# Patient Record
Sex: MALE | Race: WHITE | NOT HISPANIC OR LATINO | Employment: OTHER | URBAN - METROPOLITAN AREA
[De-identification: names, ages, dates, MRNs, and addresses within clinical notes are randomized per-mention and may not be internally consistent; named-entity substitution may affect disease eponyms.]

---

## 2018-02-06 ENCOUNTER — CLINICAL SUPPORT (OUTPATIENT)
Dept: OCCUPATIONAL MEDICINE | Facility: CLINIC | Age: 48
End: 2018-02-06

## 2018-02-06 PROCEDURE — 99499 UNLISTED E&M SERVICE: CPT | Mod: S$GLB,,, | Performed by: PREVENTIVE MEDICINE

## 2018-10-03 DIAGNOSIS — R07.89 OTHER CHEST PAIN: Primary | ICD-10-CM

## 2019-01-16 ENCOUNTER — OFFICE VISIT (OUTPATIENT)
Dept: UROLOGY | Facility: CLINIC | Age: 49
End: 2019-01-16
Payer: COMMERCIAL

## 2019-01-16 VITALS
SYSTOLIC BLOOD PRESSURE: 120 MMHG | DIASTOLIC BLOOD PRESSURE: 86 MMHG | HEART RATE: 72 BPM | BODY MASS INDEX: 36.7 KG/M2 | HEIGHT: 66 IN | WEIGHT: 228.38 LBS

## 2019-01-16 DIAGNOSIS — R10.2 ABDOMINAL PAIN, SUPRAPUBIC: ICD-10-CM

## 2019-01-16 DIAGNOSIS — Z78.9 DOUBLE VOIDING: Primary | ICD-10-CM

## 2019-01-16 DIAGNOSIS — N43.3 HYDROCELE, UNSPECIFIED HYDROCELE TYPE: ICD-10-CM

## 2019-01-16 LAB
BILIRUB SERPL-MCNC: NORMAL MG/DL
BLOOD URINE, POC: NORMAL
COLOR, POC UA: YELLOW
GLUCOSE UR QL STRIP: NORMAL
KETONES UR QL STRIP: NORMAL
LEUKOCYTE ESTERASE URINE, POC: NORMAL
NITRITE, POC UA: NORMAL
PH, POC UA: 5.5
PROTEIN, POC: NORMAL
SPECIFIC GRAVITY, POC UA: 1.02
UROBILINOGEN, POC UA: 0.2

## 2019-01-16 PROCEDURE — 3079F DIAST BP 80-89 MM HG: CPT | Mod: CPTII,S$GLB,, | Performed by: UROLOGY

## 2019-01-16 PROCEDURE — 3074F SYST BP LT 130 MM HG: CPT | Mod: CPTII,S$GLB,, | Performed by: UROLOGY

## 2019-01-16 PROCEDURE — 99204 PR OFFICE/OUTPT VISIT, NEW, LEVL IV, 45-59 MIN: ICD-10-PCS | Mod: 25,S$GLB,, | Performed by: UROLOGY

## 2019-01-16 PROCEDURE — 81002 URINALYSIS NONAUTO W/O SCOPE: CPT | Mod: S$GLB,,, | Performed by: UROLOGY

## 2019-01-16 PROCEDURE — 3008F BODY MASS INDEX DOCD: CPT | Mod: CPTII,S$GLB,, | Performed by: UROLOGY

## 2019-01-16 PROCEDURE — 81002 POCT URINE DIPSTICK WITHOUT MICROSCOPE: ICD-10-PCS | Mod: S$GLB,,, | Performed by: UROLOGY

## 2019-01-16 PROCEDURE — 87086 URINE CULTURE/COLONY COUNT: CPT

## 2019-01-16 PROCEDURE — 99204 OFFICE O/P NEW MOD 45 MIN: CPT | Mod: 25,S$GLB,, | Performed by: UROLOGY

## 2019-01-16 PROCEDURE — 3074F PR MOST RECENT SYSTOLIC BLOOD PRESSURE < 130 MM HG: ICD-10-PCS | Mod: CPTII,S$GLB,, | Performed by: UROLOGY

## 2019-01-16 PROCEDURE — 99999 PR PBB SHADOW E&M-EST. PATIENT-LVL III: ICD-10-PCS | Mod: PBBFAC,,, | Performed by: UROLOGY

## 2019-01-16 PROCEDURE — 3008F PR BODY MASS INDEX (BMI) DOCUMENTED: ICD-10-PCS | Mod: CPTII,S$GLB,, | Performed by: UROLOGY

## 2019-01-16 PROCEDURE — 3079F PR MOST RECENT DIASTOLIC BLOOD PRESSURE 80-89 MM HG: ICD-10-PCS | Mod: CPTII,S$GLB,, | Performed by: UROLOGY

## 2019-01-16 PROCEDURE — 99999 PR PBB SHADOW E&M-EST. PATIENT-LVL III: CPT | Mod: PBBFAC,,, | Performed by: UROLOGY

## 2019-01-16 RX ORDER — NAPROXEN SODIUM 220 MG
220 TABLET ORAL
COMMUNITY

## 2019-01-16 RX ORDER — MELOXICAM 7.5 MG/1
1 TABLET ORAL DAILY
Refills: 0 | COMMUNITY
Start: 2019-01-10

## 2019-01-16 RX ORDER — ALLOPURINOL 100 MG/1
100 TABLET ORAL DAILY
COMMUNITY

## 2019-01-16 RX ORDER — ESOMEPRAZOLE MAGNESIUM 40 MG/1
40 CAPSULE, DELAYED RELEASE ORAL
COMMUNITY

## 2019-01-16 RX ORDER — COLCHICINE 0.6 MG/1
TABLET ORAL
COMMUNITY

## 2019-01-16 RX ORDER — LOSARTAN POTASSIUM 50 MG/1
50 TABLET ORAL DAILY
Refills: 0 | COMMUNITY
Start: 2019-01-10

## 2019-01-16 RX ORDER — MULTIVITAMIN
1 TABLET ORAL DAILY
COMMUNITY

## 2019-01-16 NOTE — PROGRESS NOTES
HPI:  Gomez Díaz is a 48 y.o. year old male that  presents with   Chief Complaint   Patient presents with    Abdominal Pain    Urinary Frequency    Dysuria   .  This patient referred himself to the office with suprapubic pain and occasional double voiding.    He denies dysuria fevers or chills.  He has nocturia x1 and has a good strength stream with no straining to void    Patient is deaf but is able to read lips.  His wife also helps with the history    There is no family history of kidney bladder prostate cancer the patient has never had urologic surgery.    Patient denies scrotal pain    Chart review shows progress no from ENT showing hearing loss.  April 2018 PSA 0.39 and GFR greater than 60.  June 2015 pelvic AP pelvis film shows no stones.      Past Medical History:   Diagnosis Date    Hypertension      Social History     Socioeconomic History    Marital status:      Spouse name: Not on file    Number of children: Not on file    Years of education: Not on file    Highest education level: Not on file   Social Needs    Financial resource strain: Not on file    Food insecurity - worry: Not on file    Food insecurity - inability: Not on file    Transportation needs - medical: Not on file    Transportation needs - non-medical: Not on file   Occupational History    Not on file   Tobacco Use    Smoking status: Never Smoker   Substance and Sexual Activity    Alcohol use: Not on file    Drug use: Not on file    Sexual activity: Not on file   Other Topics Concern    Not on file   Social History Narrative    Not on file     Past Surgical History:   Procedure Laterality Date    CARPAL TUNNEL RELEASE Left      History reviewed. No pertinent family history.        Review of Systems  The patient has no chest pains.  The patient has no shortness of breath  Patient wears        glasses.  All other review of systems are negative.      Physical Exam:  /86 (BP Location: Right arm, Patient  "Position: Sitting, BP Method: Large (Automatic))   Pulse 72   Ht 5' 6" (1.676 m)   Wt 103.6 kg (228 lb 6.3 oz)   BMI 36.86 kg/m²   General appearance: alert, cooperative, no distress  Constitutional:Oriented to person, place, and time.appears well-developed and well-nourished.   HEENT: Normocephalic, atraumatic, neck symmetrical, no nasal discharge   Eyes: conjunctivae/corneas clear, PERRL, EOM's intact  Lungs: clear to auscultation bilaterally, no dullness to percussion bilaterally  Heart: regular rate and rhythm without rub; no displacement of the PMI   Abdomen: soft, non-tender; bowel sounds normoactive; no organomegaly  :  Penis/perineum without lesions, scrotum without rash/cysts, epididymis nontender bilaterally, urethral meatus in normal location normal size, no penile plaques palpated, prostate:    Smooth minimally enlarged and benign-feeling                   seminal vesicles not palpated.  No rectal masses, sphincter tone normal.  Normal right testicle.  Unable to palpate left testicle due to moderate size hydrocele  Extremities: extremities symmetric; no clubbing, cyanosis, or edema  Integument: Skin color, texture, turgor normal; no rashes; hair distrubution normal  Neurologic: Alert and oriented X 3, normal strength, normal coordination and gait  Psychiatric: no pressured speech; normal affect; no evidence of impaired cognition     LABS:    Complete Blood Count  Lab Results   Component Value Date    RBC 4.49 (L) 04/25/2018    HGB 13.6 (L) 04/25/2018    HCT 40.5 04/25/2018    MCV 90 04/25/2018    MCH 30.3 04/25/2018    MCHC 33.6 04/25/2018    RDW 12.9 04/25/2018     04/25/2018    MPV 10.3 04/25/2018    GRAN 2.7 04/25/2018    GRAN 51.0 04/25/2018    LYMPH 1.9 04/25/2018    LYMPH 36.3 04/25/2018    MONO 0.5 04/25/2018    MONO 10.2 04/25/2018    EOS 0.1 04/25/2018    BASO 0.03 04/25/2018    EOSINOPHIL 1.9 04/25/2018    BASOPHIL 0.6 04/25/2018    DIFFMETHOD Automated 04/25/2018 "       Comprehensive Metabolic Panel  Lab Results   Component Value Date     (H) 04/25/2018    BUN 15 04/25/2018    CREATININE 0.80 04/25/2018     04/25/2018    K 4.3 04/25/2018     04/25/2018    PROT 7.4 04/25/2018    ALBUMIN 4.5 04/25/2018    BILITOT 0.5 04/25/2018    AST 35 04/25/2018    ALKPHOS 50 04/25/2018    CO2 26 04/25/2018    ALT 65 (H) 04/25/2018    ANIONGAP 13 04/25/2018    EGFRNONAA >60.0 04/25/2018    ESTGFRAFRICA >60.0 04/25/2018       PSA  Lab Results   Component Value Date    PSA 0.39 04/25/2018         Assessment:    ICD-10-CM ICD-9-CM    1. Double voiding R39.89 788.99 POCT URINE DIPSTICK WITHOUT MICROSCOPE   2. Abdominal pain, suprapubic R10.2 789.09 Urine culture   3. Hydrocele, unspecified hydrocele type N43.3 603.9 US Scrotum And Testicles     The primary encounter diagnosis was Double voiding. Diagnoses of Abdominal pain, suprapubic and Hydrocele, unspecified hydrocele type were also pertinent to this visit.      Plan:  1 and 2.  Double voiding and abdominal pain.  I discussed with the patient I cannot explain symptoms.  Patient's urine will be cultured and once results are available ,we will contact the patient if antibiotics are indicated.  Follow-up 1 week in [place] for flow rate and postvoid residual.  Depending on results, may consider trial of alpha blockers.    3.  Hydrocele.  Not bothering patient. Will check scrotal ultrasound and discuss at next office visit  Orders Placed This Encounter   Procedures    Urine culture    US Scrotum And Testicles    POCT URINE DIPSTICK WITHOUT MICROSCOPE           Phan Wilcox MD    PLEASE NOTE:  Please be advised that portions of this note were dictated using voice recognition software and may contain dictation related errors in spelling/grammar/appropriate pronouns/syntax or other errors that might have not been found and or corrected on text review.

## 2019-01-17 LAB — BACTERIA UR CULT: NO GROWTH

## 2021-04-13 ENCOUNTER — CLINICAL SUPPORT (OUTPATIENT)
Dept: URGENT CARE | Facility: CLINIC | Age: 51
End: 2021-04-13

## 2021-04-13 DIAGNOSIS — Z00.00 PHYSICAL EXAM: Primary | ICD-10-CM

## 2021-04-13 PROCEDURE — 99499 PHYSICAL - BASIC COMPLEXITY: ICD-10-PCS | Mod: S$GLB,,, | Performed by: PREVENTIVE MEDICINE

## 2021-04-13 PROCEDURE — 99499 UNLISTED E&M SERVICE: CPT | Mod: S$GLB,,, | Performed by: PREVENTIVE MEDICINE

## 2021-04-13 RX ORDER — GEMFIBROZIL 600 MG/1
TABLET, FILM COATED ORAL
COMMUNITY

## 2021-04-13 RX ORDER — HYDROCODONE BITARTRATE AND IBUPROFEN 7.5; 2 MG/1; MG/1
TABLET, FILM COATED ORAL
COMMUNITY

## 2021-04-13 RX ORDER — PANTOPRAZOLE SODIUM 40 MG/1
TABLET, DELAYED RELEASE ORAL
COMMUNITY

## 2021-04-13 RX ORDER — MAGNESIUM 200 MG
TABLET ORAL
COMMUNITY

## 2021-04-13 RX ORDER — AMLODIPINE BESYLATE 10 MG/1
TABLET ORAL
COMMUNITY

## 2021-04-13 RX ORDER — LORAZEPAM 0.5 MG/1
TABLET ORAL
COMMUNITY

## 2021-04-13 RX ORDER — FUROSEMIDE 40 MG/1
TABLET ORAL
COMMUNITY

## 2021-04-13 RX ORDER — ESOMEPRAZOLE MAGNESIUM 40 MG/1
CAPSULE, DELAYED RELEASE ORAL
COMMUNITY

## 2021-04-13 RX ORDER — GUAIFENESIN 100 MG/5ML
10 SOLUTION ORAL
COMMUNITY

## 2021-04-13 RX ORDER — COLCHICINE 0.6 MG/1
TABLET ORAL
COMMUNITY

## 2021-04-13 RX ORDER — MELOXICAM 7.5 MG/1
TABLET ORAL
COMMUNITY

## 2021-04-13 RX ORDER — LISINOPRIL 5 MG/1
TABLET ORAL
COMMUNITY

## 2021-04-13 RX ORDER — LANOLIN ALCOHOL/MO/W.PET/CERES
CREAM (GRAM) TOPICAL
COMMUNITY

## 2021-04-13 RX ORDER — HYDROCODONE BITARTRATE AND ACETAMINOPHEN 10; 325 MG/1; MG/1
TABLET ORAL
COMMUNITY

## 2021-04-13 RX ORDER — LOSARTAN POTASSIUM 100 MG/1
TABLET ORAL
COMMUNITY

## 2021-04-13 RX ORDER — VALSARTAN 160 MG/1
TABLET ORAL
COMMUNITY

## 2024-04-30 ENCOUNTER — OFFICE VISIT (OUTPATIENT)
Dept: UROLOGY | Facility: CLINIC | Age: 54
End: 2024-04-30
Payer: COMMERCIAL

## 2024-04-30 VITALS
HEIGHT: 66 IN | HEART RATE: 92 BPM | BODY MASS INDEX: 38.25 KG/M2 | DIASTOLIC BLOOD PRESSURE: 76 MMHG | SYSTOLIC BLOOD PRESSURE: 120 MMHG | WEIGHT: 238 LBS

## 2024-04-30 DIAGNOSIS — N45.2 ORCHITIS: ICD-10-CM

## 2024-04-30 DIAGNOSIS — N40.1 BPH WITH OBSTRUCTION/LOWER URINARY TRACT SYMPTOMS: ICD-10-CM

## 2024-04-30 DIAGNOSIS — N50.819 PAIN IN TESTICLE, UNSPECIFIED LATERALITY: Primary | ICD-10-CM

## 2024-04-30 DIAGNOSIS — N13.8 BPH WITH OBSTRUCTION/LOWER URINARY TRACT SYMPTOMS: ICD-10-CM

## 2024-04-30 PROCEDURE — 4010F ACE/ARB THERAPY RXD/TAKEN: CPT | Mod: CPTII,S$GLB,,

## 2024-04-30 PROCEDURE — 3008F BODY MASS INDEX DOCD: CPT | Mod: CPTII,S$GLB,,

## 2024-04-30 PROCEDURE — 99999 PR PBB SHADOW E&M-NEW PATIENT-LVL V: CPT | Mod: PBBFAC,,,

## 2024-04-30 PROCEDURE — 99204 OFFICE O/P NEW MOD 45 MIN: CPT | Mod: S$GLB,,,

## 2024-04-30 PROCEDURE — 3074F SYST BP LT 130 MM HG: CPT | Mod: CPTII,S$GLB,,

## 2024-04-30 PROCEDURE — 1160F RVW MEDS BY RX/DR IN RCRD: CPT | Mod: CPTII,S$GLB,,

## 2024-04-30 PROCEDURE — 3078F DIAST BP <80 MM HG: CPT | Mod: CPTII,S$GLB,,

## 2024-04-30 PROCEDURE — 1159F MED LIST DOCD IN RCRD: CPT | Mod: CPTII,S$GLB,,

## 2024-04-30 RX ORDER — TAMSULOSIN HYDROCHLORIDE 0.4 MG/1
0.4 CAPSULE ORAL NIGHTLY
Qty: 90 CAPSULE | Refills: 3 | Status: SHIPPED | OUTPATIENT
Start: 2024-04-30 | End: 2025-04-30

## 2024-04-30 RX ORDER — DOXYCYCLINE 100 MG/1
100 CAPSULE ORAL EVERY 12 HOURS
Qty: 14 CAPSULE | Refills: 0 | Status: SHIPPED | OUTPATIENT
Start: 2024-04-30 | End: 2024-04-30

## 2024-04-30 RX ORDER — DOXYCYCLINE 100 MG/1
100 CAPSULE ORAL EVERY 12 HOURS
Qty: 14 CAPSULE | Refills: 0 | Status: SHIPPED | OUTPATIENT
Start: 2024-04-30 | End: 2024-05-07

## 2024-04-30 NOTE — PATIENT INSTRUCTIONS
Trial of Flomax.  Instructed patient to take 1st dose at night, 30 min after dinner, due to potential 1st dose syncope episode. Patient was also informed and educated on side effects including retrograde ejaculation.

## 2024-04-30 NOTE — PROGRESS NOTES
CHIEF COMPLAINT:  Left testicle pain and swelling       HISTORY OF PRESENTING ILLINESS:  Gomez Díaz is a 54 y.o. male new to [facility]. Presents today due to left testicle discomfort and swelling x 3-4 weeks. He is a referral from Dr. Osborn. Here today with his wife, Kath. Patient is deaf but he is able to read lips. He also reports frequent urination with urgency, present for at least 5 years. He has never tried an alpha blocker for this problem. PMHx listed below.             REVIEW OF SYSTEMS:  Review of Systems   Constitutional:  Negative for chills and fever.   HENT:  Positive for hearing loss. Negative for congestion and sore throat.    Respiratory:  Negative for cough and shortness of breath.    Cardiovascular:  Negative for chest pain and palpitations.   Gastrointestinal:  Negative for nausea and vomiting.   Genitourinary:  Positive for frequency and urgency. Negative for dysuria, flank pain and hematuria.   Neurological:  Negative for dizziness and headaches.         PATIENT HISTORY:    Past Medical History:   Diagnosis Date    Hypertension        Past Surgical History:   Procedure Laterality Date    CARPAL TUNNEL RELEASE Left        No family history on file.    Social History     Socioeconomic History    Marital status:    Tobacco Use    Smoking status: Never     Social Determinants of Health     Financial Resource Strain: Low Risk  (4/29/2024)    Overall Financial Resource Strain (CARDIA)     Difficulty of Paying Living Expenses: Not very hard   Food Insecurity: No Food Insecurity (4/29/2024)    Hunger Vital Sign     Worried About Running Out of Food in the Last Year: Never true     Ran Out of Food in the Last Year: Never true   Transportation Needs: No Transportation Needs (4/29/2024)    PRAPARE - Transportation     Lack of Transportation (Medical): No     Lack of Transportation (Non-Medical): No   Physical Activity: Unknown (4/29/2024)    Exercise Vital Sign     Days of Exercise  per Week: 3 days   Stress: No Stress Concern Present (4/29/2024)    [place] [employer] - Occupational Stress Questionnaire     Feeling of Stress : Not at all   Housing Stability: Unknown (4/29/2024)    Housing Stability Vital Sign     Unable to Pay for Housing in the Last Year: No       Allergies:  Lisinopril    Medications:    Current Outpatient Medications:     allopurinol (ZYLOPRIM) 100 MG tablet, Take 100 mg by mouth once daily., Disp: , Rfl:     amLODIPine (NORVASC) 10 MG tablet, amlodipine 10 mg tablet, Disp: , Rfl:     colchicine (COLCRYS) 0.6 mg tablet, colchicine 0.6 mg tablet, Disp: , Rfl:     colchicine (COLCRYS) 0.6 mg tablet, colchicine 0.6 mg tablet, Disp: , Rfl:     cyanocobalamin (VITAMIN B-12) 1000 MCG tablet, Vitamin B-12  1,000 mcg tablet  TK 1 T PO D, Disp: , Rfl:     cyanocobalamin, vitamin B-12, 1,000 mcg Subl, cyanocobalamin (vit B-12) 1,000 mcg sublingual tablet  Place 1 tablet(s) every day by sublingual route., Disp: , Rfl:     esomeprazole (NEXIUM) 40 MG capsule, Take 40 mg by mouth before breakfast., Disp: , Rfl:     esomeprazole (NEXIUM) 40 MG capsule, esomeprazole magnesium 40 mg capsule,delayed release  Take 1 capsule(s) every day by oral route., Disp: , Rfl:     furosemide (LASIX) 40 MG tablet, furosemide 40 mg tablet, Disp: , Rfl:     gemfibroziL (LOPID) 600 MG tablet, gemfibrozil 600 mg tablet, Disp: , Rfl:     guaiFENesin 100 mg/5 ml (ROBITUSSIN) 100 mg/5 mL syrup, 10 mLs., Disp: , Rfl:     HYDROcodone-acetaminophen (NORCO)  mg per tablet, hydrocodone 10 mg-acetaminophen 325 mg tablet, Disp: , Rfl:     hydrocodone-ibuprofen 7.5-200 mg (VICOPROFEN) 7.5-200 mg per tablet, hydrocodone 7.5 mg-ibuprofen 200 mg tablet, Disp: , Rfl:     lisinopriL (PRINIVIL,ZESTRIL) 5 MG tablet, lisinopril 5 mg tablet, Disp: , Rfl:     LORazepam (ATIVAN) 0.5 MG tablet, lorazepam 0.5 mg tablet, Disp: , Rfl:     losartan (COZAAR) 100 MG tablet, losartan 100 mg tablet  TAKE 1  TABLET BY MOUTH EVERY DAY, Disp: , Rfl:     losartan (COZAAR) 50 MG tablet, Take 50 mg by mouth once daily., Disp: , Rfl: 0    meloxicam (MOBIC) 7.5 MG tablet, Take 1 tablet by mouth once daily., Disp: , Rfl: 0    meloxicam (MOBIC) 7.5 MG tablet, meloxicam 7.5 mg tablet  TAKE 1 TABLET BY MOUTH EVERY DAY, Disp: , Rfl:     multivitamin (MEN'S MULTI-VITAMIN) per tablet, Take 1 tablet by mouth once daily., Disp: , Rfl:     naproxen sodium (ALEVE) 220 MG tablet, Take 220 mg by mouth as needed., Disp: , Rfl:     olmesartan (BENICAR) 40 MG tablet, Take 40 mg by mouth once daily., Disp: , Rfl:     pantoprazole (PROTONIX) 40 MG tablet, pantoprazole 40 mg tablet,delayed release  TAKE 1 TABLET BY MOUTH EVERY DAY, Disp: , Rfl:     valsartan (DIOVAN) 160 MG tablet, Diovan 160 mg tablet  TAKE 1 TABLET BY MOUTH EVERY DAY, Disp: , Rfl:     doxycycline (VIBRAMYCIN) 100 MG Cap, Take 1 capsule (100 mg total) by mouth every 12 (twelve) hours. for 7 days, Disp: 14 capsule, Rfl: 0    tamsulosin (FLOMAX) 0.4 mg Cap, Take 1 capsule (0.4 mg total) by mouth every evening., Disp: 90 capsule, Rfl: 3    PHYSICAL EXAMINATION:  Physical Exam  Constitutional:       Appearance: Normal appearance.   HENT:      Head: Normocephalic and atraumatic.      Right Ear: External ear normal.      Left Ear: External ear normal.      Nose: Nose normal.      Mouth/Throat:      Mouth: Mucous membranes are moist.   Pulmonary:      Effort: Pulmonary effort is normal. No respiratory distress.   Skin:     General: Skin is warm and dry.   Neurological:      General: No focal deficit present.      Mental Status: He is alert and oriented to person, place, and time.   Psychiatric:         Mood and Affect: Mood normal.         Behavior: Behavior normal.           LABS:  Patient unable to urinate, voided in lobby BR        Lab Results   Component Value Date    PSA 0.43 04/17/2024    PSA 0.34 07/06/2023    PSA 0.48 06/10/2022    PSATOTAL 0.6 02/05/2016       Lab Results    Component Value Date    CREATININE 0.8 04/17/2024    EGFRNORACEVR >60.0 04/17/2024               IMPRESSION:    Encounter Diagnoses   Name Primary?    Pain in testicle, unspecified laterality Yes    BPH with obstruction/lower urinary tract symptoms     Orchitis          Assessment:       1. Pain in testicle, unspecified laterality    2. BPH with obstruction/lower urinary tract symptoms    3. Orchitis        Plan:   - 7 day course of doxycycline prescribed for orchitis.  - US scrotum and testicles scheduled, will call with results.  - Testicle pain precautions attached to patient instrucitons.  - Trial of Flomax. Instructed patient to take 1st dose at night when in bed due to potential 1st dose syncope episode. Patient was also informed and educated on side effects including retrograde ejaculation.     RTC 6 weeks with PVR and to discuss Flomax efficacy     I spent 45 minutes with the patient of which more than half was spent in direct consultation with the patient in regards to our treatment and plan.  We addressed the office findings and recent labs; any need to go ER today.   Education and recommendations of today's plan of care including home remedies and needed follow up with PCP.   We discussed the chief complaints; reviewed the LUTS and the possible contributory factors.

## 2024-05-01 ENCOUNTER — HOSPITAL ENCOUNTER (OUTPATIENT)
Dept: RADIOLOGY | Facility: HOSPITAL | Age: 54
Discharge: HOME | End: 2024-05-01
Payer: COMMERCIAL

## 2024-05-01 DIAGNOSIS — N50.819 PAIN IN TESTICLE, UNSPECIFIED LATERALITY: ICD-10-CM

## 2024-05-01 PROCEDURE — 76870 US EXAM SCROTUM: CPT | Mod: 26,,, | Performed by: RADIOLOGY

## 2024-05-01 PROCEDURE — 76870 US EXAM SCROTUM: CPT | Mod: TC

## 2024-05-07 ENCOUNTER — PATIENT MESSAGE (OUTPATIENT)
Dept: UROLOGY | Facility: CLINIC | Age: 54
End: 2024-05-07
Payer: COMMERCIAL

## 2024-05-08 ENCOUNTER — PATIENT MESSAGE (OUTPATIENT)
Dept: UROLOGY | Facility: CLINIC | Age: 54
End: 2024-05-08
Payer: COMMERCIAL

## 2024-05-08 RX ORDER — DOXYCYCLINE 100 MG/1
100 CAPSULE ORAL EVERY 12 HOURS
Qty: 14 CAPSULE | Refills: 0 | Status: SHIPPED | OUTPATIENT
Start: 2024-05-08 | End: 2024-05-15

## 2024-05-14 ENCOUNTER — OFFICE VISIT (OUTPATIENT)
Dept: UROLOGY | Facility: CLINIC | Age: 54
End: 2024-05-14
Payer: COMMERCIAL

## 2024-05-14 VITALS
BODY MASS INDEX: 37.88 KG/M2 | WEIGHT: 235.69 LBS | SYSTOLIC BLOOD PRESSURE: 126 MMHG | DIASTOLIC BLOOD PRESSURE: 81 MMHG | HEART RATE: 95 BPM | HEIGHT: 66 IN

## 2024-05-14 DIAGNOSIS — N13.8 BPH WITH OBSTRUCTION/LOWER URINARY TRACT SYMPTOMS: ICD-10-CM

## 2024-05-14 DIAGNOSIS — N40.1 BPH WITH OBSTRUCTION/LOWER URINARY TRACT SYMPTOMS: ICD-10-CM

## 2024-05-14 DIAGNOSIS — N45.2 ORCHITIS: ICD-10-CM

## 2024-05-14 DIAGNOSIS — N50.819 PAIN IN TESTICLE, UNSPECIFIED LATERALITY: Primary | ICD-10-CM

## 2024-05-14 PROCEDURE — 1159F MED LIST DOCD IN RCRD: CPT | Mod: CPTII,S$GLB,,

## 2024-05-14 PROCEDURE — 3008F BODY MASS INDEX DOCD: CPT | Mod: CPTII,S$GLB,,

## 2024-05-14 PROCEDURE — 4010F ACE/ARB THERAPY RXD/TAKEN: CPT | Mod: CPTII,S$GLB,,

## 2024-05-14 PROCEDURE — 1160F RVW MEDS BY RX/DR IN RCRD: CPT | Mod: CPTII,S$GLB,,

## 2024-05-14 PROCEDURE — 99214 OFFICE O/P EST MOD 30 MIN: CPT | Mod: S$GLB,,,

## 2024-05-14 PROCEDURE — 3074F SYST BP LT 130 MM HG: CPT | Mod: CPTII,S$GLB,,

## 2024-05-14 PROCEDURE — 3079F DIAST BP 80-89 MM HG: CPT | Mod: CPTII,S$GLB,,

## 2024-05-14 PROCEDURE — 99999 PR PBB SHADOW E&M-EST. PATIENT-LVL V: CPT | Mod: PBBFAC,,,

## 2024-05-14 RX ORDER — CIPROFLOXACIN 500 MG/1
500 TABLET ORAL EVERY 12 HOURS
Qty: 20 TABLET | Refills: 0 | Status: SHIPPED | OUTPATIENT
Start: 2024-05-14 | End: 2024-05-24

## 2024-05-14 NOTE — PROGRESS NOTES
CHIEF COMPLAINT:  Testicle pain       HISTORY OF PRESENTING ILLINESS:  Gomez Díaz is a 54 y.o. male established to [facility]. Presents today due to ongoing left testicle discomfort and swelling x 4-5 weeks without improvement despite doxycycline x 12 days. He was a referral from Dr. Osborn. Here today with his wife, Kath. Patient is deaf but he is able to read lips. He also reports frequent urination with urgency, present for at least 5 years. Symptoms have improved somewhat with Flomax that was prescribed during initial visit 4/30/2024.     4/30/2024 US scrotum and testicles  Increased perfusion of the testicles bilaterally concerning for acute bilateral orchitis.  Large bilateral spermatoceles.        REVIEW OF SYSTEMS:  Review of Systems   Constitutional:  Negative for chills and fever.   HENT:  Positive for hearing loss. Negative for congestion and sore throat.    Respiratory:  Negative for cough and shortness of breath.    Cardiovascular:  Negative for chest pain and palpitations.   Gastrointestinal:  Negative for nausea and vomiting.   Genitourinary:  Negative for dysuria, flank pain, frequency, hematuria and urgency.        +testicle pain   Neurological:  Negative for dizziness and headaches.         PATIENT HISTORY:    Past Medical History:   Diagnosis Date    Hypertension        Past Surgical History:   Procedure Laterality Date    CARPAL TUNNEL RELEASE Left        No family history on file.    Social History     Socioeconomic History    Marital status:    Tobacco Use    Smoking status: Never     Social Determinants of Health     Financial Resource Strain: Low Risk  (4/29/2024)    Overall Financial Resource Strain (CARDIA)     Difficulty of Paying Living Expenses: Not very hard   Food Insecurity: No Food Insecurity (4/29/2024)    Hunger Vital Sign     Worried About Running Out of Food in the Last Year: Never true     Ran Out of Food in the Last Year: Never true   Transportation Needs:  No Transportation Needs (4/29/2024)    PRAPARE - Transportation     Lack of Transportation (Medical): No     Lack of Transportation (Non-Medical): No   Physical Activity: Unknown (4/29/2024)    Exercise Vital Sign     Days of Exercise per Week: 3 days   Stress: No Stress Concern Present (4/29/2024)    Citizen of [place] [employer] - Occupational Stress Questionnaire     Feeling of Stress : Not at all   Housing Stability: Unknown (4/29/2024)    Housing Stability Vital Sign     Unable to Pay for Housing in the Last Year: No       Allergies:  Lisinopril    Medications:    Current Outpatient Medications:     allopurinol (ZYLOPRIM) 100 MG tablet, Take 100 mg by mouth once daily., Disp: , Rfl:     amLODIPine (NORVASC) 10 MG tablet, amlodipine 10 mg tablet, Disp: , Rfl:     ciprofloxacin HCl (CIPRO) 500 MG tablet, Take 1 tablet (500 mg total) by mouth every 12 (twelve) hours. for 10 days, Disp: 20 tablet, Rfl: 0    colchicine (COLCRYS) 0.6 mg tablet, colchicine 0.6 mg tablet, Disp: , Rfl:     colchicine (COLCRYS) 0.6 mg tablet, colchicine 0.6 mg tablet, Disp: , Rfl:     cyanocobalamin (VITAMIN B-12) 1000 MCG tablet, Vitamin B-12  1,000 mcg tablet  TK 1 T PO D, Disp: , Rfl:     cyanocobalamin, vitamin B-12, 1,000 mcg Subl, cyanocobalamin (vit B-12) 1,000 mcg sublingual tablet  Place 1 tablet(s) every day by sublingual route., Disp: , Rfl:     doxycycline (VIBRAMYCIN) 100 MG Cap, Take 1 capsule (100 mg total) by mouth every 12 (twelve) hours. for 7 days, Disp: 14 capsule, Rfl: 0    esomeprazole (NEXIUM) 40 MG capsule, Take 40 mg by mouth before breakfast., Disp: , Rfl:     esomeprazole (NEXIUM) 40 MG capsule, esomeprazole magnesium 40 mg capsule,delayed release  Take 1 capsule(s) every day by oral route., Disp: , Rfl:     furosemide (LASIX) 40 MG tablet, furosemide 40 mg tablet, Disp: , Rfl:     gemfibroziL (LOPID) 600 MG tablet, gemfibrozil 600 mg tablet, Disp: , Rfl:     guaiFENesin 100 mg/5 ml (ROBITUSSIN) 100  mg/5 mL syrup, 10 mLs., Disp: , Rfl:     HYDROcodone-acetaminophen (NORCO)  mg per tablet, hydrocodone 10 mg-acetaminophen 325 mg tablet, Disp: , Rfl:     hydrocodone-ibuprofen 7.5-200 mg (VICOPROFEN) 7.5-200 mg per tablet, hydrocodone 7.5 mg-ibuprofen 200 mg tablet, Disp: , Rfl:     lisinopriL (PRINIVIL,ZESTRIL) 5 MG tablet, lisinopril 5 mg tablet, Disp: , Rfl:     LORazepam (ATIVAN) 0.5 MG tablet, lorazepam 0.5 mg tablet, Disp: , Rfl:     losartan (COZAAR) 100 MG tablet, losartan 100 mg tablet  TAKE 1 TABLET BY MOUTH EVERY DAY, Disp: , Rfl:     losartan (COZAAR) 50 MG tablet, Take 50 mg by mouth once daily., Disp: , Rfl: 0    meloxicam (MOBIC) 7.5 MG tablet, Take 1 tablet by mouth once daily., Disp: , Rfl: 0    meloxicam (MOBIC) 7.5 MG tablet, meloxicam 7.5 mg tablet  TAKE 1 TABLET BY MOUTH EVERY DAY, Disp: , Rfl:     multivitamin (MEN'S MULTI-VITAMIN) per tablet, Take 1 tablet by mouth once daily., Disp: , Rfl:     naproxen sodium (ALEVE) 220 MG tablet, Take 220 mg by mouth as needed., Disp: , Rfl:     olmesartan (BENICAR) 40 MG tablet, Take 40 mg by mouth once daily., Disp: , Rfl:     pantoprazole (PROTONIX) 40 MG tablet, pantoprazole 40 mg tablet,delayed release  TAKE 1 TABLET BY MOUTH EVERY DAY, Disp: , Rfl:     tamsulosin (FLOMAX) 0.4 mg Cap, Take 1 capsule (0.4 mg total) by mouth every evening., Disp: 90 capsule, Rfl: 3    valsartan (DIOVAN) 160 MG tablet, Diovan 160 mg tablet  TAKE 1 TABLET BY MOUTH EVERY DAY, Disp: , Rfl:     PHYSICAL EXAMINATION:  Physical Exam  Constitutional:       Appearance: Normal appearance.   HENT:      Head: Normocephalic and atraumatic.      Right Ear: External ear normal.      Left Ear: External ear normal.      Nose: Nose normal.      Mouth/Throat:      Mouth: Mucous membranes are moist.   Pulmonary:      Effort: Pulmonary effort is normal. No respiratory distress.   Abdominal:      Hernia: There is no hernia in the left inguinal area or right inguinal area.    Genitourinary:     Testes:         Right: Tenderness and swelling present.         Left: Tenderness and swelling present.      Comments: Large bilateral spermatoceles   Lymphadenopathy:      Lower Body: No right inguinal adenopathy. No left inguinal adenopathy.   Skin:     General: Skin is warm and dry.   Neurological:      General: No focal deficit present.      Mental Status: He is alert and oriented to person, place, and time.   Psychiatric:         Mood and Affect: Mood normal.         Behavior: Behavior normal.           LABS:  UA WNL      Lab Results   Component Value Date    PSA 0.43 04/17/2024    PSA 0.34 07/06/2023    PSA 0.48 06/10/2022    PSATOTAL 0.6 02/05/2016       Lab Results   Component Value Date    CREATININE 0.8 04/17/2024    EGFRNORACEVR >60.0 04/17/2024               IMPRESSION:    Encounter Diagnoses   Name Primary?    Pain in testicle, unspecified laterality Yes    Orchitis     BPH with obstruction/lower urinary tract symptoms          Assessment:       1. Pain in testicle, unspecified laterality    2. Orchitis    3. BPH with obstruction/lower urinary tract symptoms        Plan:   - DC doxycyline. Begin 10 day course of ciprofloxacin. Continue testicle pain precautions. Discussed he should have improvement in discomfort, swelling may decrease slightly with completion of antibiotic, should have a return to baseline due to large spermatoceles.  - Continue Flomax as prescribed.    RTC 5 weeks for re-evaluation of symptoms, will refer to Dr. Taveras     I spent 30 minutes with the patient of which more than half was spent in direct consultation with the patient in regards to our treatment and plan.  We addressed the office findings and recent labs; any need to go ER today.   Education and recommendations of today's plan of care including home remedies and needed follow up with PCP.   We discussed the chief complaints; reviewed the LUTS and the possible contributory factors.   Reassurance no  infection or visible blood seen in today's urine sample

## 2025-01-02 ENCOUNTER — OFFICE VISIT (OUTPATIENT)
Facility: CLINIC | Age: 55
End: 2025-01-02
Payer: COMMERCIAL

## 2025-01-02 VITALS
SYSTOLIC BLOOD PRESSURE: 140 MMHG | DIASTOLIC BLOOD PRESSURE: 88 MMHG | WEIGHT: 236.31 LBS | HEIGHT: 66 IN | BODY MASS INDEX: 37.98 KG/M2 | HEART RATE: 97 BPM

## 2025-01-02 DIAGNOSIS — R42 DIZZINESS AND GIDDINESS: ICD-10-CM

## 2025-01-02 DIAGNOSIS — G47.30 SLEEP APNEA, UNSPECIFIED TYPE: ICD-10-CM

## 2025-01-02 DIAGNOSIS — R51.9 NONINTRACTABLE EPISODIC HEADACHE, UNSPECIFIED HEADACHE TYPE: Primary | ICD-10-CM

## 2025-01-02 PROCEDURE — 99999 PR PBB SHADOW E&M-EST. PATIENT-LVL V: CPT | Mod: PBBFAC,,, | Performed by: STUDENT IN AN ORGANIZED HEALTH CARE EDUCATION/TRAINING PROGRAM

## 2025-01-02 RX ORDER — INDOMETHACIN 50 MG/1
CAPSULE ORAL
COMMUNITY

## 2025-01-02 RX ORDER — ALLOPURINOL 300 MG/1
300 TABLET ORAL
COMMUNITY

## 2025-01-02 NOTE — PROGRESS NOTES
[facility] NEUROLOGY  [facility], [place]    Date: 1/2/25  Patient Name: Gomez Díaz   MRN: [id number]   PCP: FRANKIE William (Inactive)  Referring Provider: Jane Osborn,[place]    Assessment:   Gomez Díaz is a 54 y.o. male presenting for headaches. Case most consistent with but not limited to possible episodic vertigo/vestibular symptoms. Lower in the differential: atypical migraines, brain hypoperfusion, vertigo. These episodes started happening 2-3 months ago. No clear trigger. No clear relationship with position or head turning. He denied any other neurological problems. Physical exam unremarkable. No signs of CNS disease. Given odd symptoms, new onset headaches in >51 yo man, and neurological symptoms, I will start work up with an MRI of the brain w contrast. Since his episodes sound somewhat vestibular in nature, I will also send a referral to ENT. We will plan a follow up after he sees ENT. Will also send sleep referral for untreated MALINA. He verbalized understanding and agreed with the plan.     Plan:     - MRI brain w wo contrast  - Sleep medicine referral   - ENT referral  - Follow up after he sees ENT     Problem List Items Addressed This Visit          Neuro    Headache, unspecified - Primary    Relevant Orders    MRI Brain W WO Contrast    Ambulatory referral/consult to ENT       Other    Dizziness and giddiness    Relevant Orders    MRI Brain W WO Contrast    Ambulatory referral/consult to ENT    Sleep apnea    Relevant Orders    Ambulatory referral/consult to Sleep Disorders     Rey San MD    Subjective:   Patient seen in consultation at the request of Jane Osborn,* for the evaluation of headaches. A copy of this note will be sent to the referring physician.      HPI 1/2/24:   Mr. Gomez Díaz is a 54 y.o. male presenting for headaches and dizziness. He has a history of HTN, gout, HLD, chronic bilateral . He stated that for the past 3 months he has  "been dealing with dizzy/imbalance spells associated with head pain/ lightheadedness. No clear triggers. The pain is described as: short-lived seconds to minutes + sensation of imbalance. Imbalance is described as a mix between vertigo and instability. If he walks while he feels like this, his symptoms worsen. He feels better by sitting down after this happens. These events happen while laying down, sitting down and standing up. No clear nausea or vomiting. This happens regardless of position of head turning. He denied any significant tinnitus, ear fullness. He does have a history of progressive bilateral hearing loss of no identified cause (started in his teenage years). In between episodes, he is fine and "normal". No other neurological complaints at this time.     PAST MEDICAL HISTORY:  Past Medical History:   Diagnosis Date    Hypertension        PAST SURGICAL HISTORY:  Past Surgical History:   Procedure Laterality Date    CARPAL TUNNEL RELEASE Left        CURRENT MEDS:  Current Outpatient Medications   Medication Sig Dispense Refill    allopurinol (ZYLOPRIM) 100 MG tablet Take 100 mg by mouth once daily.      allopurinoL (ZYLOPRIM) 300 MG tablet Take 300 mg by mouth.      amLODIPine (NORVASC) 10 MG tablet amlodipine 10 mg tablet      indomethacin (INDOCIN) 50 MG capsule TAKE ONE CAPSULE BY MOUTH THREE TIMES DAILY AFTER A MEAL      multivitamin (MEN'S MULTI-VITAMIN) per tablet Take 1 tablet by mouth once daily.      pantoprazole (PROTONIX) 40 MG tablet pantoprazole 40 mg tablet,delayed release   TAKE 1 TABLET BY MOUTH EVERY DAY       No current facility-administered medications for this visit.       ALLERGIES:  Review of patient's allergies indicates:   Allergen Reactions    Lisinopril Swelling       FAMILY HISTORY:  No family history on file.    SOCIAL HISTORY:  Social History     Tobacco Use    Smoking status: Never       Review of Systems:  12 system review of systems is negative except for the symptoms " "mentioned in HPI.      Objective:     Vitals:    01/02/25 1346   BP: (!) 140/88   BP Location: Right arm   Patient Position: Sitting   Pulse: 97   Weight: 107.2 kg (236 lb 5.3 oz)   Height: 5' 6" (1.676 m)     General: NAD, well nourished   Eyes: no tearing, discharge, no erythema   ENT: moist mucous membranes of the oral cavity, nares patent    Neck: Supple, full range of motion  Cardiovascular: Warm and well perfused, pulses equal and symmetrical  Lungs: Normal work of breathing, normal chest wall excursions  Skin: No rash, lesions, or breakdown on exposed skin  Psychiatry: Mood and affect are appropriate   Abdomen: soft, non tender, non distended  Extremeties: No cyanosis, clubbing or edema.    Neurological   MENTAL STATUS: Alert and oriented to person, place, and time. Attention and concentration within normal limits. Speech without dysarthria, able to name and repeat without difficulty. Recent and remote memory within normal limits   CRANIAL NERVES: Visual fields intact. PERRL. EOMI. Facial sensation intact. Face symmetrical. Hearing grossly intact. Full shoulder shrug bilaterally. Tongue protrudes midline   SENSORY: Sensation is intact to pin throughout.  Joint position perception intact. Negative Romberg.   MOTOR: Normal bulk and tone. No pronator drift.  5/5 deltoid, biceps, triceps, interosseous, hand  bilaterally. 5/5 iliopsoas, knee extension/flexion, foot dorsi/plantarflexion bilaterally.    REFLEXES: Symmetric and 2+ throughout. Toes down going bilaterally.   CEREBELLAR/COORDINATION/GAIT: Gait steady with normal arm swing and stride length.  Heel to shin intact. Finger to nose intact. Normal rapid alternating movements.     I spent a total of 50 minutes on the day of the visit.  This includes face to face time and non-face to face time preparing to see the patient (eg, review of tests), obtaining and/or reviewing separately obtained history, documenting clinical information in the electronic or " other health record, independently interpreting results and communicating results to the patient/family/caregiver, or care coordinator.      Rey San MD  Neurology

## 2025-01-29 NOTE — PROGRESS NOTES
[facility] - Urology   Clinic Note    SUBJECTIVE:     Chief Complaint   Patient presents with    Testicle Pain       Referral from: No ref. provider found.    History of Present Illness:  Gomez Díaz is a 54 y.o. male who presents to clinic for left orchalgia.    Patient presents with complaints of left testicular pain for several months.  Describes pain as dull and achy but occasionally sharp.  The pain waxes and wanes in intensity and is intermittent.  Denies any issues with erections.  Denies any high-risk sexual activity.  Denies any dysuria or significantly bothersome urinary complaints. Takes flomax. Previously had a scrotal US which revealed bilateral spermatoceles    Patient endorses no additional complaints at this time.    Past Medical History:   Diagnosis Date    Hypertension        Past Surgical History:   Procedure Laterality Date    CARPAL TUNNEL RELEASE Left        No family history on file.    Social History     Tobacco Use    Smoking status: Never       Current Outpatient Medications on File Prior to Visit   Medication Sig Dispense Refill    allopurinol (ZYLOPRIM) 100 MG tablet Take 100 mg by mouth once daily.      allopurinoL (ZYLOPRIM) 300 MG tablet Take 300 mg by mouth.      amLODIPine (NORVASC) 10 MG tablet amlodipine 10 mg tablet      indomethacin (INDOCIN) 50 MG capsule TAKE ONE CAPSULE BY MOUTH THREE TIMES DAILY AFTER A MEAL      multivitamin (MEN'S MULTI-VITAMIN) per tablet Take 1 tablet by mouth once daily.      pantoprazole (PROTONIX) 40 MG tablet pantoprazole 40 mg tablet,delayed release   TAKE 1 TABLET BY MOUTH EVERY DAY       No current facility-administered medications on file prior to visit.       Review of patient's allergies indicates:   Allergen Reactions    Lisinopril Swelling       Review of Systems:  A review of 10+ systems was conducted with pertinent positive and negative findings documented in HPI with all other systems reviewed and negative.    OBJECTIVE:     Estimated body  "mass index is 37.77 kg/m² as calculated from the following:    Height as of this encounter: 5' 6" (1.676 m).    Weight as of this encounter: 106.1 kg (234 lb 0.3 oz).    Vital Signs (Most Recent)  Vitals:    01/30/25 1312   BP: (!) 143/88   Pulse: 98       Physical Exam:  GENERAL: patient sitting comfortably  HEENT: normocephalic  NECK: supple, no JVD  PULM: normal chest rise, no increased WOB  HEART: non-diaphoretic  ABDO: soft, nondistended, nontender  BACK: no CVA tenderness bilaterally  SKIN: warm, dry, well perfused  EXT: no bruising or edema  NEURO: grossly normal with no focal deficits  PSYCH: appropriate mood and affect    Genitourinary Exam:  A large left hydrocele or spermatocele is present.  It is approximately the size of a large orange.  A small right hydrocele or spermatocele is present but significantly smaller than left.    Lab Results   Component Value Date    BUN 13 04/17/2024    CREATININE 0.8 04/17/2024    WBC 6.34 04/17/2024    HGB 13.3 (L) 04/17/2024    HCT 38.6 (L) 04/17/2024     04/17/2024    AST 32 04/17/2024    ALT 48 (H) 04/17/2024    ALKPHOS 54 (L) 04/17/2024    ALBUMIN 4.1 04/17/2024    HGBA1C 5.6 06/10/2022        Imaging:  I have personally reviewed all relevant imaging studies.    No results found for this or any previous visit (from the past 2160 hours).  No results found for this or any previous visit (from the past 2160 hours).  US Scrotum And Testicles  Narrative: EXAMINATION:  US SCROTUM AND TESTICLES    CLINICAL HISTORY:  Testicular pain, unspecified    TECHNIQUE:  Sonography of the scrotum and testes.    COMPARISON:  Ultrasound from 01/21/2019    FINDINGS:  Right Testicle:    *Size: 4.2 x 2.8 x 2.5 cm  *Appearance: Normal.  *Flow: Normal arterial and venous flow.  Increased perfusion.  *Epididymis: Normal.  *Spermatocele: Large similar to prior exam  *Varicocele: None.  .    Left Testicle:    *Size: 4.6 x 2.1 x 2.3 cm  *Appearance: Normal.  *Flow: Normal arterial and " "venous flow.  Increased perfusion.  *Epididymis: Normal.  *Spermatocele: Large similar to prior exam.  *Varicocele: None.  .    Other findings: Right testicular appendage measuring 0.6 cm.  Impression: Increased perfusion of the testicles bilaterally concerning for acute bilateral orchitis.    Large bilateral spermatoceles.    Electronically signed by: Arthur Anne MD  Date:    05/01/2024  Time:    12:05       PSA:  Lab Results   Component Value Date    PSA 0.43 04/17/2024    PSA 0.34 07/06/2023    PSA 0.48 06/10/2022    PSA 0.40 05/27/2021    PSA 0.32 03/03/2020    PSA 0.30 02/13/2019    PSA 0.39 04/25/2018    PSA 0.36 03/27/2017    PSATOTAL 0.6 02/05/2016       Testosterone:  No results found for: "TOTALTESTOST", "TESTOSTERONE"     ASSESSMENT     1. Left testicular pain    2. BPH with obstruction/lower urinary tract symptoms    3. Hydrocele in adult        PLAN:     Discussed options.  Patient with a large left hydrocele or spermatocele.  He elected for left hydrocele or spermatocele ectomy.  Risks benefits and alternatives were discussed.  Recurrence rates discussed.    Macario Bucio MD  Urology  [facility]    Disclaimer: This note has been generated using voice-recognition software. There may be typographical errors that have been missed during proof-reading.     "

## 2025-01-30 ENCOUNTER — OFFICE VISIT (OUTPATIENT)
Dept: UROLOGY | Facility: CLINIC | Age: 55
End: 2025-01-30
Payer: COMMERCIAL

## 2025-01-30 ENCOUNTER — TELEPHONE (OUTPATIENT)
Dept: UROLOGY | Facility: CLINIC | Age: 55
End: 2025-01-30

## 2025-01-30 VITALS
SYSTOLIC BLOOD PRESSURE: 143 MMHG | DIASTOLIC BLOOD PRESSURE: 88 MMHG | HEIGHT: 66 IN | BODY MASS INDEX: 37.61 KG/M2 | HEART RATE: 98 BPM | WEIGHT: 234 LBS

## 2025-01-30 DIAGNOSIS — N43.3 HYDROCELE IN ADULT: ICD-10-CM

## 2025-01-30 DIAGNOSIS — N13.8 BPH WITH OBSTRUCTION/LOWER URINARY TRACT SYMPTOMS: ICD-10-CM

## 2025-01-30 DIAGNOSIS — N50.812 LEFT TESTICULAR PAIN: Primary | ICD-10-CM

## 2025-01-30 DIAGNOSIS — N40.1 BPH WITH OBSTRUCTION/LOWER URINARY TRACT SYMPTOMS: ICD-10-CM

## 2025-01-30 PROCEDURE — 99999 PR PBB SHADOW E&M-EST. PATIENT-LVL IV: CPT | Mod: PBBFAC,,, | Performed by: UROLOGY

## 2025-01-30 PROCEDURE — 99214 OFFICE O/P EST MOD 30 MIN: CPT | Mod: S$GLB,,, | Performed by: UROLOGY

## 2025-01-30 PROCEDURE — 3008F BODY MASS INDEX DOCD: CPT | Mod: CPTII,S$GLB,, | Performed by: UROLOGY

## 2025-01-30 PROCEDURE — 3077F SYST BP >= 140 MM HG: CPT | Mod: CPTII,S$GLB,, | Performed by: UROLOGY

## 2025-01-30 PROCEDURE — 1160F RVW MEDS BY RX/DR IN RCRD: CPT | Mod: CPTII,S$GLB,, | Performed by: UROLOGY

## 2025-01-30 PROCEDURE — 1159F MED LIST DOCD IN RCRD: CPT | Mod: CPTII,S$GLB,, | Performed by: UROLOGY

## 2025-01-30 PROCEDURE — 3079F DIAST BP 80-89 MM HG: CPT | Mod: CPTII,S$GLB,, | Performed by: UROLOGY

## 2025-01-30 RX ORDER — CEFAZOLIN SODIUM 2 G/50ML
2 SOLUTION INTRAVENOUS
OUTPATIENT
Start: 2025-01-30

## 2025-01-30 RX ORDER — ACETAMINOPHEN 500 MG
1000 TABLET ORAL
OUTPATIENT
Start: 2025-01-30

## 2025-01-30 RX ORDER — LIDOCAINE HYDROCHLORIDE 10 MG/ML
1 INJECTION, SOLUTION EPIDURAL; INFILTRATION; INTRACAUDAL; PERINEURAL ONCE
OUTPATIENT
Start: 2025-01-30 | End: 2025-01-30

## 2025-01-30 NOTE — TELEPHONE ENCOUNTER
----- Message from Macario Bucio MD sent at 1/30/2025  2:04 PM CST -----  Doing a spermatocelectomy on 3/11. Please get them any day of surgery info that they need

## 2025-01-30 NOTE — TELEPHONE ENCOUNTER
I called and spoke with pt's wife Kath and shanti her preop information. I told her that PAT dept would reach out if they want any additional testing, I told her that she would receive a call the day before surgery with arrival time and other instructions, also instructed to please hold indocin for 7 days prior to surgery. She verbalized understanding of everything discussed.

## 2025-01-31 ENCOUNTER — TELEPHONE (OUTPATIENT)
Dept: PREADMISSION TESTING | Facility: HOSPITAL | Age: 55
End: 2025-01-31
Payer: COMMERCIAL

## 2025-01-31 DIAGNOSIS — Z01.818 PRE-OP EVALUATION: Primary | ICD-10-CM

## 2025-01-31 DIAGNOSIS — I10 HYPERTENSION, UNSPECIFIED TYPE: ICD-10-CM

## 2025-02-06 ENCOUNTER — CLINICAL SUPPORT (OUTPATIENT)
Dept: OTOLARYNGOLOGY | Facility: CLINIC | Age: 55
End: 2025-02-06
Payer: COMMERCIAL

## 2025-02-06 ENCOUNTER — TELEPHONE (OUTPATIENT)
Dept: OTOLARYNGOLOGY | Facility: CLINIC | Age: 55
End: 2025-02-06

## 2025-02-06 ENCOUNTER — OFFICE VISIT (OUTPATIENT)
Dept: OTOLARYNGOLOGY | Facility: CLINIC | Age: 55
End: 2025-02-06
Payer: COMMERCIAL

## 2025-02-06 VITALS
DIASTOLIC BLOOD PRESSURE: 90 MMHG | HEART RATE: 96 BPM | SYSTOLIC BLOOD PRESSURE: 152 MMHG | BODY MASS INDEX: 38.43 KG/M2 | WEIGHT: 238.13 LBS

## 2025-02-06 DIAGNOSIS — H91.93 BILATERAL DEAFNESS: Primary | ICD-10-CM

## 2025-02-06 DIAGNOSIS — R51.9 NONINTRACTABLE EPISODIC HEADACHE, UNSPECIFIED HEADACHE TYPE: ICD-10-CM

## 2025-02-06 DIAGNOSIS — H91.93 PROFOUND BILATERAL HEARING LOSS: ICD-10-CM

## 2025-02-06 DIAGNOSIS — H81.399 OTHER PERIPHERAL VERTIGO, UNSPECIFIED EAR: Primary | ICD-10-CM

## 2025-02-06 DIAGNOSIS — R42 DIZZINESS AND GIDDINESS: ICD-10-CM

## 2025-02-06 PROCEDURE — 3077F SYST BP >= 140 MM HG: CPT | Mod: CPTII,S$GLB,, | Performed by: NURSE PRACTITIONER

## 2025-02-06 PROCEDURE — 1160F RVW MEDS BY RX/DR IN RCRD: CPT | Mod: CPTII,S$GLB,, | Performed by: NURSE PRACTITIONER

## 2025-02-06 PROCEDURE — 3008F BODY MASS INDEX DOCD: CPT | Mod: CPTII,S$GLB,, | Performed by: NURSE PRACTITIONER

## 2025-02-06 PROCEDURE — 3080F DIAST BP >= 90 MM HG: CPT | Mod: CPTII,S$GLB,, | Performed by: NURSE PRACTITIONER

## 2025-02-06 PROCEDURE — 99999 PR PBB SHADOW E&M-EST. PATIENT-LVL V: CPT | Mod: PBBFAC,,, | Performed by: NURSE PRACTITIONER

## 2025-02-06 PROCEDURE — 99999 PR PBB SHADOW E&M-EST. PATIENT-LVL II: CPT | Mod: PBBFAC,,,

## 2025-02-06 PROCEDURE — 92557 COMPREHENSIVE HEARING TEST: CPT | Mod: S$GLB,,,

## 2025-02-06 PROCEDURE — 1159F MED LIST DOCD IN RCRD: CPT | Mod: CPTII,S$GLB,, | Performed by: NURSE PRACTITIONER

## 2025-02-06 PROCEDURE — 99204 OFFICE O/P NEW MOD 45 MIN: CPT | Mod: S$GLB,,, | Performed by: NURSE PRACTITIONER

## 2025-02-06 PROCEDURE — 92550 TYMPANOMETRY & REFLEX THRESH: CPT | Mod: S$GLB,,,

## 2025-02-06 NOTE — PROGRESS NOTES
"Gomez Díaz, a 54 y.o. male was seen today in the clinic for an audiologic evaluation. The patient's primary complaint was dizziness with head pain.  He was accompanied to the appointment by his wife who provided majority of the history. She reports Mr. Díaz was fit with hearing aids "many" years ago but was not successful with amplification. Mr. Díaz has history of profound (progressive) sensorineural hearing hearing loss. He denies ear pain and drainage today. History of noise exposure working as a .     Pure tone testing revealed profound sensorineural hearing loss, bilaterally. Speech testing was attempted but only yielded a speech awareness at 100 dBHL in the left ear. Tympanometry revealed Type A tympanograms in both ears. Acoustic reflexes were absent, bilaterally.    Results were reviewed with the patient and the recommendation of a cochlear implant evaluation was discussed.    Recommendations:  Otologic evaluation  Hearing protection in noise  Cochlear implant evaluation          "

## 2025-02-06 NOTE — PROGRESS NOTES
Chief Complaint   Patient presents with    Dizziness     For over some months now also stated just happens doesn't have be to turning or transitioning     Headache        HPI:   The patient who is referred to me by Dr. Rey San in consultation for evaluation of dizziness. He is here today with his wife. He has a history of profound hearing loss in both ears and read lips. His wife also provides the history today. The symptoms started  2-3  months ago and have essentially resolved. The sensation is also described as: spinning sensation and off balanced. He states that his eyes were moving and hard to focus when it happened. The attacks occur intermittently and last a few minutes. He states that the episodes lasted for a week and he has not had any dizziness over 1.5 month ago. Positions that worsen symptoms: turning head and looking up .  Associated ear symptoms: none. History of noise exposure working as a . Family history of hearing loss. Associated CNS symptoms: headaches. Recent infections: none. Head trauma: denied.Previous workup: none. Previous treatment includes: no medications to date    Patient reports a history of migraine headaches. He is currently following up with neurology.     Past Medical History:   Diagnosis Date    Hypertension      Social History     Socioeconomic History    Marital status:    Tobacco Use    Smoking status: Never     Social Drivers of Health     Financial Resource Strain: Low Risk  (4/29/2024)    Overall Financial Resource Strain (CARDIA)     Difficulty of Paying Living Expenses: Not very hard   Food Insecurity: No Food Insecurity (4/29/2024)    Hunger Vital Sign     Worried About Running Out of Food in the Last Year: Never true     Ran Out of Food in the Last Year: Never true   Transportation Needs: No Transportation Needs (4/29/2024)    PRAPARE - Transportation     Lack of Transportation (Medical): No     Lack of Transportation (Non-Medical): No    Physical Activity: Unknown (4/29/2024)    Exercise Vital Sign     Days of Exercise per Week: 3 days   Stress: No Stress Concern Present (4/29/2024)    [place] [employer] [place] [employer] - Occupational Stress Questionnaire     Feeling of Stress : Not at all   Housing Stability: Unknown (4/29/2024)    Housing Stability Vital Sign     Unable to Pay for Housing in the Last Year: No     Past Surgical History:   Procedure Laterality Date    CARPAL TUNNEL RELEASE Left      No family history on file.        Review of Systems  General: negative for chills, fever or weight loss  Psychological: negative for mood changes or depression  Ophthalmic: negative for blurry vision, photophobia or eye pain  ENT: see HPI  Respiratory: no cough, shortness of breath, or wheezing  Cardiovascular: no chest pain or dyspnea on exertion  Gastrointestinal: no abdominal pain, change in bowel habits, or black/ bloody stools  Musculoskeletal: negative for gait disturbance or muscular weakness  Neurological: no syncope or seizures; no ataxia  Dermatological: negative for pruritis,  rash and jaundice  Hematologic/lymphatic: no easy bruising, no new adenopathy    Physical Exam:    Vitals:    02/06/25 0845   BP: (!) 152/90   Pulse: 96       Constitutional: Well appearing / communicating without difficutly.  NAD.  Eyes: EOM I Bilaterally  Head/Face: Normocephalic.  Negative paranasal sinus pressure/tenderness.  Salivary glands WNL.  House Brackmann I Bilaterally.    Right Ear: Auricle normal appearance. External Auditory Canal within normal limits no lesions or masses,TM w/o masses/lesions/perforations. TM mobility noted.   Left Ear: Auricle normal appearance. External Auditory Canal within normal limits no lesions or masses,TM w/o masses/lesions/perforations. TM mobility noted.  Nose: No gross nasal septal deviation. Inferior Turbinates 3+ bilaterally. No septal perforation. No masses/lesions. External nasal skin appears normal without  masses/lesions.  Oral Cavity: Gingiva/lips within normal limits.  Dentition/gingiva healthy appearing. Mucus membranes moist. Floor of mouth soft, no masses palpated. Oral Tongue mobile. Hard Palate appears normal.    Oropharynx: Base of tongue appears normal. No masses/lesions noted. Tonsillar fossa/pharyngeal wall without lesions. Posterior oropharynx WNL.  Soft palate without masses. Midline uvula.   Neck/Lymphatic: No LAD I-VI bilaterally.  No thyromegaly.  No masses noted on exam.    Mirror laryngoscopy/nasopharyngoscopy: Active gag reflex.  Unable to perform.    Neuro/Psychiatric: AOx3.  Normal mood and affect.   Cardiovascular: Normal carotid pulses bilaterally, no increasing jugular venous distention noted at cervical region bilaterally.    Respiratory: Normal respiratory effort, no stridor, no retractions noted.      Audiogram: Reviewed and interpreted by me, and discussed with the patient today.    Pure tone testing revealed profound sensorineural hearing loss, bilaterally. Speech testing was attempted but only yielded a speech awareness at 100 dBHL in the left ear. Tympanometry revealed Type A tympanograms in both ears. Acoustic reflexes were absent, bilaterally.         Assessment:    ICD-10-CM ICD-9-CM    1. Other peripheral vertigo, unspecified ear  H81.399 386.19 MRI IAC/Temporal Bones W W/O Contrast      POCT CREATININE      2. Profound bilateral hearing loss  H91.93 389.9 Ambulatory referral/consult to ENT        The primary encounter diagnosis was Other peripheral vertigo, unspecified ear. A diagnosis of Profound bilateral hearing loss was also pertinent to this visit.      Plan:  Orders Placed This Encounter   Procedures    MRI IAC/Temporal Bones W W/O Contrast    Ambulatory referral/consult to ENT     -I had a long discussion with the patient regarding their symptoms.  Dizziness, vertigo and disequilibrium are common symptoms reported by adults during visits to their doctors. Discussed various  factors related to dizziness and imbalance including: age-related changes, cardiovascular, orthopedic, vestibular or neurologic. Common disorders of the inner ear include: Meniere's disease, vestibular neuritis, labyrinthitis, and benign paroxysmal positional vertigo (BPPV). Based on his history, I suspect that he was having positional vertigo vs vestibular migraine.  Symptoms have resolved. He knows to come back if symptoms return.   -continue to follow up with neurology for migraine headaches  - otoscopic exam was benign.   -ordered MRI IAC/temp to rule out retrocochlear pathologies.    - We reviewed the patient's recent audiogram and hearing loss in detail.  His wife expresses interest in having cochlear implants   - referral to neurotology     Margi Ramos NP

## 2025-02-11 ENCOUNTER — PATIENT MESSAGE (OUTPATIENT)
Dept: OTOLARYNGOLOGY | Facility: CLINIC | Age: 55
End: 2025-02-11
Payer: COMMERCIAL

## 2025-02-25 ENCOUNTER — RESULTS FOLLOW-UP (OUTPATIENT)
Dept: ANESTHESIOLOGY | Facility: HOSPITAL | Age: 55
End: 2025-02-25

## 2025-02-26 ENCOUNTER — HOSPITAL ENCOUNTER (OUTPATIENT)
Dept: PREADMISSION TESTING | Facility: HOSPITAL | Age: 55
Discharge: HOME | End: 2025-02-26
Attending: NURSE PRACTITIONER
Payer: COMMERCIAL

## 2025-02-26 ENCOUNTER — PATIENT MESSAGE (OUTPATIENT)
Dept: PREADMISSION TESTING | Facility: HOSPITAL | Age: 55
End: 2025-02-26

## 2025-02-26 ENCOUNTER — ANESTHESIA EVENT (OUTPATIENT)
Dept: SURGERY | Facility: HOSPITAL | Age: 55
End: 2025-02-26
Payer: COMMERCIAL

## 2025-02-26 PROBLEM — K21.9 GASTROESOPHAGEAL REFLUX DISEASE: Status: ACTIVE | Noted: 2024-08-13

## 2025-02-26 PROBLEM — G62.9 NEUROPATHY: Status: ACTIVE | Noted: 2025-02-26

## 2025-02-26 PROBLEM — H91.90 HEARING LOSS: Status: ACTIVE | Noted: 2025-02-26

## 2025-02-26 PROBLEM — M10.9 GOUT: Status: ACTIVE | Noted: 2020-04-17

## 2025-02-26 PROBLEM — M19.90 OSTEOARTHRITIS: Status: ACTIVE | Noted: 2023-05-01

## 2025-02-26 PROBLEM — I10 ESSENTIAL HYPERTENSION: Status: ACTIVE | Noted: 2020-04-17

## 2025-02-26 PROBLEM — M19.90 ARTHRITIS: Status: ACTIVE | Noted: 2020-10-12

## 2025-02-26 PROBLEM — J30.2 SEASONAL ALLERGIES: Status: ACTIVE | Noted: 2021-01-24

## 2025-02-26 PROBLEM — R05.3 CHRONIC COUGH: Status: ACTIVE | Noted: 2024-11-13

## 2025-02-26 PROBLEM — E78.5 HYPERLIPIDEMIA: Status: ACTIVE | Noted: 2020-04-17

## 2025-02-26 PROBLEM — N40.0 BENIGN PROSTATIC HYPERPLASIA: Status: ACTIVE | Noted: 2024-08-13

## 2025-02-26 PROBLEM — E66.9 OBESITY WITH BODY MASS INDEX 30 OR GREATER: Status: ACTIVE | Noted: 2025-02-26

## 2025-02-26 NOTE — PRE-PROCEDURE INSTRUCTIONS
Wife.    Allergies, medical, surgical, family and psychosocial histories reviewed with patient. Periop plan of care reviewed. Preop instructions given, including medications to take and to hold. Hibiclens soap and instructions on use given. Time allotted for questions to be addressed.      Arrival time 0915.    Surgery instructions reviewed and surgery booklet sent or emailed to the patient via the portal.

## 2025-02-26 NOTE — ANESTHESIA PREPROCEDURE EVALUATION
[facility]  Anesthesia Pre-Operative Evaluation    Patient Name: Gomez Díaz  YOB: 1970  MRN: [id number]    SUBJECTIVE:     Pre-operative evaluation for Procedure(s) (LRB):  EXCISION, SPERMATOCELE (Left)    03/11/2025    Gomez Díaz is a 54 y.o. male with HTN, MALINA, GERD, and obesity presenting for the above procedure(s).    MALINA w/ CPAP Rx but does not use.  Sleeps on 3 pillows  Hx of anesthetics in past and endorses PONV and Headaches with every anesthetic.  NPO>8 hours  No food or drug allergies  Amenable to proceed with scheduled procedure      2D ECHO:  TTE:  No results found for this or any previous visit.      LLUVIA:  No results found for this or any previous visit.    LDA: None documented.    Prev airway: None documented.    Drips: None documented.      Problem List[1]    Review of patient's allergies indicates:   Allergen Reactions    Lisinopril Swelling       Current Inpatient Medications:   LIDOcaine (PF) 10 mg/ml (1%)  1 mL Intradermal Once    LIDOcaine (PF) 10 mg/ml (1%)  1 mL Intradermal Once       Antibiotics (From admission, onward)      Start     Stop Route Frequency Ordered    03/11/25 0743  ceFAZolin 2 g         -- IV On Call Procedure 03/11/25 0743            VTE Risk Mitigation (From admission, onward)           Ordered     IP VTE LOW RISK PATIENT  Once         03/11/25 0743     Place sequential compression device  Until discontinued         03/11/25 0743                    Medications Ordered Prior to Encounter[2]    Past Surgical History:   Procedure Laterality Date    CARPAL TUNNEL RELEASE Left        Social History[3]    OBJECTIVE:     Vital Signs Range (Last 24H):         Significant Labs:  Lab Results   Component Value Date    WBC 6.34 04/17/2024    HGB 13.3 (L) 04/17/2024    HCT 38.6 (L) 04/17/2024     04/17/2024    CHOL 199 04/17/2024    TRIG 223 (H) 04/17/2024    HDL 44 04/17/2024    ALT 48 (H) 04/17/2024    AST 32 04/17/2024     02/25/2025     K 4.4 02/25/2025     02/25/2025    CREATININE 0.8 02/25/2025    BUN 12 02/25/2025    CO2 24 02/25/2025    TSH 1.994 04/17/2024    PSA 0.43 04/17/2024    HGBA1C 5.6 06/10/2022       Diagnostic Studies: No relevant studies.    EKG:   Results for orders placed or performed in visit on 02/25/25   EKG 12-lead    Collection Time: 02/25/25  8:16 AM   Result Value Ref Range    QRS Duration 124 ms    [facility] QTC Calculation 459 ms    Narrative    Test Reason : Z01.818,I10,    Vent. Rate :  85 BPM     Atrial Rate :  85 BPM     P-R Int : 150 ms          QRS Dur : 124 ms      QT Int : 386 ms       P-R-T Axes :  40 -26  50 degrees    QTcB Int : 459 ms    Normal sinus rhythm  Nonspecific intraventricular conduction delay  Borderline Abnormal ECG  When compared with ECG of 25-Apr-2018 07:38,  No significant change was found  Confirmed by Dick Rubalcava ([id number]) on 2/26/2025 8:10:51 AM    Referred By: DANNA HERNANDEZ           Confirmed By: Dick Rubalcava         ASSESSMENT/PLAN:           Pre-op Assessment    I have reviewed the Patient Summary Reports.     I have reviewed the Nursing Notes. I have reviewed the NPO Status.   I have reviewed the Medications.     Review of Systems  Anesthesia Hx:  No problems with previous Anesthesia   History of prior surgery of interest to airway management or planning:          Denies Family Hx of Anesthesia complications.   Personal Hx of Anesthesia complications (severe post-op headache following MAC and general), Post-Operative Nausea/Vomiting                    Social:  Non-Smoker, Social Alcohol Use       EENT/Dental:     Ears General/Symptom(s) Hearing Impairment: deaf - left, deaf- right            Cardiovascular:  Exercise tolerance: good   Hypertension          PVD hyperlipidemia                               Pulmonary:        Sleep Apnea Sleeps with head of elevated               Hepatic/GI:     GERD, well controlled                Musculoskeletal:  Arthritis                Neurological:      Headaches                                 Endocrine:        Obesity / BMI > 30, Morbid Obesity / BMI > 40      Physical Exam  General: Well nourished, Cooperative, Alert and Oriented    Airway:  Mallampati: II / II  Mouth Opening: Normal  TM Distance: Normal  Tongue: Normal  Neck ROM: Normal ROM    Dental:  Intact, Periodontal disease      Lab Results   Component Value Date    WBC 6.34 04/17/2024    HGB 13.3 (L) 04/17/2024    HCT 38.6 (L) 04/17/2024     04/17/2024    CHOL 199 04/17/2024    TRIG 223 (H) 04/17/2024    HDL 44 04/17/2024    ALT 48 (H) 04/17/2024    AST 32 04/17/2024     02/25/2025    K 4.4 02/25/2025     02/25/2025    CREATININE 0.8 02/25/2025    BUN 12 02/25/2025    CO2 24 02/25/2025    TSH 1.994 04/17/2024    PSA 0.43 04/17/2024    HGBA1C 5.6 06/10/2022     Results for orders placed or performed in visit on 02/25/25   EKG 12-lead    Collection Time: 02/25/25  8:16 AM   Result Value Ref Range    QRS Duration 124 ms    [facility]HS QTC Calculation 459 ms    Narrative    Test Reason : Z01.818,I10,    Vent. Rate :  85 BPM     Atrial Rate :  85 BPM     P-R Int : 150 ms          QRS Dur : 124 ms      QT Int : 386 ms       P-R-T Axes :  40 -26  50 degrees    QTcB Int : 459 ms    Normal sinus rhythm  Nonspecific intraventricular conduction delay  Borderline Abnormal ECG  When compared with ECG of 25-Apr-2018 07:38,  No significant change was found  Confirmed by Dick Rubalcava ([id number]) on 2/26/2025 8:10:51 AM    Referred By: DANNA HERNANDEZ           Confirmed By: Dick Rubalcava         Anesthesia Plan  Type of Anesthesia, risks & benefits discussed:    Anesthesia Type: Gen Supraglottic Airway, Gen ETT  Intra-op Monitoring Plan: Standard ASA Monitors  Post Op Pain Control Plan: multimodal analgesia and IV/PO Opioids PRN  Induction:  IV  Airway Plan: Direct, Post-Induction  Informed Consent: Informed consent signed with the Patient and all parties understand the risks and agree with  anesthesia plan.  All questions answered. Patient consented to blood products? No  ASA Score: 3  Day of Surgery Review of History & Physical: H&P Update referred to the surgeon/provider.  Anesthesia Plan Notes: Anesthesia consent to be signed prior to surgery 3/11/25    FYI-Patient is very hard of hearing but does read lips. He requests medication to help prevent headache following anesthesia.      Ready For Surgery From Anesthesia Perspective.     .           [1]   Patient Active Problem List  Diagnosis    Dizziness and giddiness    Headache, unspecified    Sleep apnea    Arthritis    Osteoarthritis    Benign prostatic hyperplasia    Chronic cough    Essential hypertension    Gastroesophageal reflux disease    Gout    Hearing loss    Hyperlipidemia    Neuropathy    Obesity with body mass index 30 or greater    Seasonal allergies   [2]   No current facility-administered medications on file prior to encounter.     Current Outpatient Medications on File Prior to Encounter   Medication Sig Dispense Refill    allopurinol (ZYLOPRIM) 100 MG tablet Take 100 mg by mouth once daily.      allopurinoL (ZYLOPRIM) 300 MG tablet Take 300 mg by mouth.      amLODIPine (NORVASC) 10 MG tablet amlodipine 10 mg tablet      multivitamin (MEN'S MULTI-VITAMIN) per tablet Take 1 tablet by mouth once daily.      pantoprazole (PROTONIX) 40 MG tablet pantoprazole 40 mg tablet,delayed release   TAKE 1 TABLET BY MOUTH EVERY DAY      indomethacin (INDOCIN) 50 MG capsule TAKE ONE CAPSULE BY MOUTH THREE TIMES DAILY AFTER A MEAL     [3]   Social History  Socioeconomic History    Marital status:    Tobacco Use    Smoking status: Never     Social Drivers of Health     Financial Resource Strain: Low Risk  (4/29/2024)    Overall Financial Resource Strain (CARDIA)     Difficulty of Paying Living Expenses: Not very hard   Food Insecurity: No Food Insecurity (4/29/2024)    Hunger Vital Sign     Worried About Running Out of Food in the Last Year:  Never true     Ran Out of Food in the Last Year: Never true   Transportation Needs: No Transportation Needs (4/29/2024)    PRAPARE - Transportation     Lack of Transportation (Medical): No     Lack of Transportation (Non-Medical): No   Physical Activity: Unknown (4/29/2024)    Exercise Vital Sign     Days of Exercise per Week: 3 days   Stress: No Stress Concern Present (4/29/2024)    [employer] - Occupational Stress Questionnaire     Feeling of Stress : Not at all   Housing Stability: Unknown (4/29/2024)    Housing Stability Vital Sign     Unable to Pay for Housing in the Last Year: No

## 2025-02-26 NOTE — DISCHARGE INSTRUCTIONS

## 2025-03-11 ENCOUNTER — ANESTHESIA (OUTPATIENT)
Dept: SURGERY | Facility: HOSPITAL | Age: 55
End: 2025-03-11
Payer: COMMERCIAL

## 2025-03-11 ENCOUNTER — HOSPITAL ENCOUNTER (OUTPATIENT)
Facility: HOSPITAL | Age: 55
Discharge: HOME | End: 2025-03-11
Attending: UROLOGY | Admitting: UROLOGY
Payer: COMMERCIAL

## 2025-03-11 VITALS
SYSTOLIC BLOOD PRESSURE: 134 MMHG | OXYGEN SATURATION: 96 % | RESPIRATION RATE: 19 BRPM | HEART RATE: 83 BPM | DIASTOLIC BLOOD PRESSURE: 81 MMHG | TEMPERATURE: 98 F | HEIGHT: 66 IN | BODY MASS INDEX: 36.64 KG/M2 | WEIGHT: 228 LBS

## 2025-03-11 DIAGNOSIS — N50.812 LEFT TESTICULAR PAIN: Primary | ICD-10-CM

## 2025-03-11 PROCEDURE — 63600175 PHARM REV CODE 636 W HCPCS: Performed by: UROLOGY

## 2025-03-11 PROCEDURE — 37000009 HC ANESTHESIA EA ADD 15 MINS: Performed by: UROLOGY

## 2025-03-11 PROCEDURE — 25000003 PHARM REV CODE 250

## 2025-03-11 PROCEDURE — 71000016 HC POSTOP RECOV ADDL HR: Performed by: UROLOGY

## 2025-03-11 PROCEDURE — 63600175 PHARM REV CODE 636 W HCPCS

## 2025-03-11 PROCEDURE — 37000008 HC ANESTHESIA 1ST 15 MINUTES: Performed by: UROLOGY

## 2025-03-11 PROCEDURE — 71000015 HC POSTOP RECOV 1ST HR: Performed by: UROLOGY

## 2025-03-11 PROCEDURE — 25000003 PHARM REV CODE 250: Performed by: UROLOGY

## 2025-03-11 PROCEDURE — 71000033 HC RECOVERY, INTIAL HOUR: Performed by: UROLOGY

## 2025-03-11 PROCEDURE — 36000707: Performed by: UROLOGY

## 2025-03-11 PROCEDURE — 55040 REMOVAL OF HYDROCELE: CPT | Mod: LT,,, | Performed by: UROLOGY

## 2025-03-11 PROCEDURE — 36000706: Performed by: UROLOGY

## 2025-03-11 PROCEDURE — C1729 CATH, DRAINAGE: HCPCS | Performed by: UROLOGY

## 2025-03-11 PROCEDURE — 88302 TISSUE EXAM BY PATHOLOGIST: CPT | Performed by: PATHOLOGY

## 2025-03-11 RX ORDER — ONDANSETRON HYDROCHLORIDE 2 MG/ML
4 INJECTION, SOLUTION INTRAVENOUS DAILY PRN
Status: DISCONTINUED | OUTPATIENT
Start: 2025-03-11 | End: 2025-03-11 | Stop reason: HOSPADM

## 2025-03-11 RX ORDER — DEXAMETHASONE SODIUM PHOSPHATE 4 MG/ML
INJECTION, SOLUTION INTRA-ARTICULAR; INTRALESIONAL; INTRAMUSCULAR; INTRAVENOUS; SOFT TISSUE
Status: DISCONTINUED | OUTPATIENT
Start: 2025-03-11 | End: 2025-03-11

## 2025-03-11 RX ORDER — OXYCODONE HYDROCHLORIDE 5 MG/1
5 TABLET ORAL EVERY 4 HOURS PRN
Qty: 10 TABLET | Refills: 0 | Status: SHIPPED | OUTPATIENT
Start: 2025-03-11

## 2025-03-11 RX ORDER — MIDAZOLAM HYDROCHLORIDE 1 MG/ML
INJECTION INTRAMUSCULAR; INTRAVENOUS
Status: DISCONTINUED | OUTPATIENT
Start: 2025-03-11 | End: 2025-03-11

## 2025-03-11 RX ORDER — ACETAMINOPHEN 500 MG
1000 TABLET ORAL
Status: COMPLETED | OUTPATIENT
Start: 2025-03-11 | End: 2025-03-11

## 2025-03-11 RX ORDER — KETAMINE HCL IN 0.9 % NACL 50 MG/5 ML
SYRINGE (ML) INTRAVENOUS
Status: DISCONTINUED | OUTPATIENT
Start: 2025-03-11 | End: 2025-03-11

## 2025-03-11 RX ORDER — OXYCODONE HYDROCHLORIDE 5 MG/1
5 TABLET ORAL
Status: DISCONTINUED | OUTPATIENT
Start: 2025-03-11 | End: 2025-03-11 | Stop reason: HOSPADM

## 2025-03-11 RX ORDER — CEFAZOLIN 2 G/1
2 INJECTION, POWDER, FOR SOLUTION INTRAMUSCULAR; INTRAVENOUS
Status: DISCONTINUED | OUTPATIENT
Start: 2025-03-11 | End: 2025-03-11 | Stop reason: HOSPADM

## 2025-03-11 RX ORDER — HYDROMORPHONE HYDROCHLORIDE 2 MG/ML
0.2 INJECTION, SOLUTION INTRAMUSCULAR; INTRAVENOUS; SUBCUTANEOUS EVERY 5 MIN PRN
Status: DISCONTINUED | OUTPATIENT
Start: 2025-03-11 | End: 2025-03-11 | Stop reason: HOSPADM

## 2025-03-11 RX ORDER — OXYCODONE HYDROCHLORIDE 10 MG/1
10 TABLET ORAL EVERY 4 HOURS PRN
Status: DISCONTINUED | OUTPATIENT
Start: 2025-03-11 | End: 2025-03-11 | Stop reason: HOSPADM

## 2025-03-11 RX ORDER — PROCHLORPERAZINE EDISYLATE 5 MG/ML
5 INJECTION INTRAMUSCULAR; INTRAVENOUS EVERY 30 MIN PRN
Status: DISCONTINUED | OUTPATIENT
Start: 2025-03-11 | End: 2025-03-11 | Stop reason: HOSPADM

## 2025-03-11 RX ORDER — LIDOCAINE HYDROCHLORIDE 10 MG/ML
1 INJECTION, SOLUTION EPIDURAL; INFILTRATION; INTRACAUDAL; PERINEURAL ONCE
Status: DISCONTINUED | OUTPATIENT
Start: 2025-03-11 | End: 2025-03-11 | Stop reason: HOSPADM

## 2025-03-11 RX ORDER — LIDOCAINE HYDROCHLORIDE 20 MG/ML
INJECTION, SOLUTION EPIDURAL; INFILTRATION; INTRACAUDAL; PERINEURAL
Status: DISCONTINUED | OUTPATIENT
Start: 2025-03-11 | End: 2025-03-11

## 2025-03-11 RX ORDER — CEFAZOLIN SODIUM 1 G/3ML
INJECTION, POWDER, FOR SOLUTION INTRAMUSCULAR; INTRAVENOUS
Status: DISCONTINUED | OUTPATIENT
Start: 2025-03-11 | End: 2025-03-11

## 2025-03-11 RX ORDER — HALOPERIDOL LACTATE 5 MG/ML
0.5 INJECTION, SOLUTION INTRAMUSCULAR EVERY 10 MIN PRN
Status: DISCONTINUED | OUTPATIENT
Start: 2025-03-11 | End: 2025-03-11 | Stop reason: HOSPADM

## 2025-03-11 RX ORDER — PROPOFOL 10 MG/ML
VIAL (ML) INTRAVENOUS CONTINUOUS PRN
Status: DISCONTINUED | OUTPATIENT
Start: 2025-03-11 | End: 2025-03-11

## 2025-03-11 RX ORDER — GLUCAGON 1 MG
1 KIT INJECTION
Status: DISCONTINUED | OUTPATIENT
Start: 2025-03-11 | End: 2025-03-11 | Stop reason: HOSPADM

## 2025-03-11 RX ORDER — ONDANSETRON HYDROCHLORIDE 2 MG/ML
INJECTION, SOLUTION INTRAVENOUS
Status: DISCONTINUED | OUTPATIENT
Start: 2025-03-11 | End: 2025-03-11

## 2025-03-11 RX ORDER — BUPIVACAINE HYDROCHLORIDE 2.5 MG/ML
INJECTION, SOLUTION EPIDURAL; INFILTRATION; INTRACAUDAL; PERINEURAL
Status: DISCONTINUED | OUTPATIENT
Start: 2025-03-11 | End: 2025-03-11 | Stop reason: HOSPADM

## 2025-03-11 RX ORDER — PROPOFOL 10 MG/ML
VIAL (ML) INTRAVENOUS
Status: DISCONTINUED | OUTPATIENT
Start: 2025-03-11 | End: 2025-03-11

## 2025-03-11 RX ORDER — FENTANYL CITRATE 50 UG/ML
INJECTION, SOLUTION INTRAMUSCULAR; INTRAVENOUS
Status: DISCONTINUED | OUTPATIENT
Start: 2025-03-11 | End: 2025-03-11

## 2025-03-11 RX ADMIN — CEFAZOLIN 2 G: 330 INJECTION, POWDER, FOR SOLUTION INTRAMUSCULAR; INTRAVENOUS at 09:03

## 2025-03-11 RX ADMIN — ONDANSETRON 4 MG: 2 INJECTION, SOLUTION INTRAMUSCULAR; INTRAVENOUS at 09:03

## 2025-03-11 RX ADMIN — PROPOFOL 100 MG: 10 INJECTION, EMULSION INTRAVENOUS at 09:03

## 2025-03-11 RX ADMIN — PROPOFOL 100 MCG/KG/MIN: 10 INJECTION, EMULSION INTRAVENOUS at 09:03

## 2025-03-11 RX ADMIN — FENTANYL CITRATE 100 MCG: 50 INJECTION INTRAMUSCULAR; INTRAVENOUS at 09:03

## 2025-03-11 RX ADMIN — LIDOCAINE HYDROCHLORIDE 100 MG: 20 INJECTION, SOLUTION EPIDURAL; INFILTRATION; INTRACAUDAL; PERINEURAL at 09:03

## 2025-03-11 RX ADMIN — DEXAMETHASONE SODIUM PHOSPHATE 8 MG: 4 INJECTION, SOLUTION INTRA-ARTICULAR; INTRALESIONAL; INTRAMUSCULAR; INTRAVENOUS; SOFT TISSUE at 09:03

## 2025-03-11 RX ADMIN — Medication 20 MG: at 09:03

## 2025-03-11 RX ADMIN — SODIUM CHLORIDE, SODIUM LACTATE, POTASSIUM CHLORIDE, AND CALCIUM CHLORIDE: .6; .31; .03; .02 INJECTION, SOLUTION INTRAVENOUS at 09:03

## 2025-03-11 RX ADMIN — MIDAZOLAM HYDROCHLORIDE 2 MG: 1 INJECTION, SOLUTION INTRAMUSCULAR; INTRAVENOUS at 09:03

## 2025-03-11 RX ADMIN — Medication 10 MG: at 09:03

## 2025-03-11 RX ADMIN — ACETAMINOPHEN 1000 MG: 500 TABLET ORAL at 08:03

## 2025-03-11 RX ADMIN — OXYCODONE 5 MG: 5 TABLET ORAL at 10:03

## 2025-03-11 NOTE — DISCHARGE INSTRUCTIONS
SEE PRESCRIPTION INSERTS FOR INFORMATION ON YOUR MEDICATIONS     Postoperative Instructions  No heavy lifting greater than 10 pounds or a gallon of milk  Do not strain to have a bowel movement  No strenuous exercise  You may sponge bathe  No driving while you are on narcotic pain medications or if your hernadez  catheter is in place  No showering until 48 hours after surgery.    Call the doctor if:  Temperature is greater than 101F  Persistent vomiting and inability to keep food down  Inability to pee        ANESTHESIA  -For the first 24 hours after surgery:  Do not drive, use heavy equipment, make important decisions, or drink alcohol  -It is normal to feel sleepy for several hours.  Rest until you are more awake.  -Have someone stay with you, if needed.  They can watch for problems and help keep you safe.  -Some possible post anesthesia side effects include: nausea and vomiting, sore throat and hoarseness, sleepiness, and dizziness.    PAIN  -If you have pain after surgery, pain medicine will help you feel better.  Take it as directed, before pain becomes severe.  Most pain relievers taken by mouth need at least 20-30 minutes to start working.  -Do not drive or drink alcohol while taking pain medicine.  -Pain medication can upset your stomach.  Taking them with a little food may help.  -Other ways to help control pain: elevation, ice, and relaxation  -Call your surgeon if still having unmanageable pain an hour after taking pain medicine.  -Pain medicine can cause constipation.  Taking an over-the counter stool softener while on prescription pain medicine and drinking plenty of fluids can prevent this side effect.  -Call your surgeon if you have severe side effects like: breathing problems, trouble waking up, dizziness, confusion, or severe constipation.    NAUSEA  -Some people have nausea after surgery.  This is often because of anesthesia, pain, pain medicine, or the stress of surgery.  -Do not push yourself to  eat.  Start off with clear liquids and soup.  Slowly move to solid foods.  Don't eat fatty, rich, spicy foods at first.  Eat smaller amounts.  -If you develop persistent nausea and vomiting please notify your surgeon immediately.    BLEEDING  -Different types of surgery require different types of care and dressing changes.  It is important to follow all instructions and advice from your surgeon.  Change dressing as directed.  Call your surgeon for any concerns regarding postop bleeding.    SIGNS OF INFECTION  -Signs of infection include: fever, swelling, drainage, and redness  -Notify your surgeon if you have a fever of 100.4 F (38.0 C) or higher.  -Notify your surgeon if you notice redness, swelling, increased pain, pus, or a foul smell at the incision site.

## 2025-03-11 NOTE — H&P
"Preoperative History and Physical    Date:   2025    History of Present Illness:    54 y.o. male who presents for bilateral spermatocele/hydrocele repair.    There is no change in H&P since last office visit. Will proceed with planned procedure.      Past Medical History:    has a past medical history of Hypertension.    Past Surgical History:    has a past surgical history that includes Carpal tunnel release (Left).    Social History:  Social History     Tobacco Use    Smoking status: Never    Smokeless tobacco: Not on file   Substance Use Topics    Alcohol use: Not on file     Social History     Substance and Sexual Activity   Drug Use Not on file       Family History:  No family history on file.    Allergies:  Review of patient's allergies indicates:   Allergen Reactions    Lisinopril Swelling       Home Medications:  Scheduled Meds:   LIDOcaine (PF) 10 mg/ml (1%)  1 mL Intradermal Once    LIDOcaine (PF) 10 mg/ml (1%)  1 mL Intradermal Once     Continuous Infusions:  PRN Meds:.  Current Facility-Administered Medications:     ceFAZolin (Ancef) IV (PEDS and ADULTS), 2 g, Intravenous, On Call Procedure      Review of Systems:  Negative except for what is noted in HPI    Physical Exam:  VITAL SIGNS:   Vitals:    25 0800   BP: (!) 145/87   BP Location: Right arm   Patient Position: Lying   Pulse: 78   Resp: 18   Temp: 99 °F (37.2 °C)   TempSrc: Skin   SpO2: 96%   Weight: 103.4 kg (228 lb)   Height: 5' 6" (1.676 m)     TMAX: Temp (24hrs), Av °F (37.2 °C), Min:99 °F (37.2 °C), Max:99 °F (37.2 °C)      General: Alert; No acute distress  Cardiovascular: Regular rate   Respiratory: Normal respiratory effort. Not in respiratory distress  Extremity: No obvious gross deformity of extremity  Neurologic: Normal speech  Psych: Normal behavior        Diagnostic Data:  No results found for this or any previous visit (from the past 2 weeks).  Recent Results (from the past 2 weeks)   BASIC METABOLIC PANEL    " "Collection Time: 02/25/25  8:21 AM   Result Value Ref Range    Sodium 142 136 - 145 mmol/L    Potassium 4.4 3.5 - 5.1 mmol/L    Chloride 105 95 - 110 mmol/L    CO2 24 23 - 29 mmol/L    BUN 12 6 - 20 mg/dL    Creatinine 0.8 0.5 - 1.4 mg/dL    Calcium 9.5 8.7 - 10.5 mg/dL    Anion Gap 13 8 - 16 mmol/L     Lab Results   Component Value Date    ALBUMIN 4.1 04/17/2024     No results found for: "CRP"  No results found for: "INR", "PROTIME"  No results found for: "PTT"    Microbiology Results (last 7 days)       ** No results found for the last 168 hours. **            Assessment:  54 y.o.male here for bilateral spermatocele/hydrocele repair.     Plan:  Will proceed to OR  Consent obtained      José Miguel Olivarez MD - PGY2   "

## 2025-03-11 NOTE — BRIEF OP NOTE
[place] - Surgery ([facility])  Brief Operative Note    Surgery Date: 3/11/2025     Surgeons and Role:     * Macario Bucio MD - Primary    Assisting Surgeon: José Miguel Olivarez MD - Resident     Pre-op Diagnosis:  Left testicular pain [N50.812]    Post-op Diagnosis:  Post-Op Diagnosis Codes:     * Left testicular pain [N50.812]    Procedure(s) (LRB):  EXCISION, SPERMATOCELE (Left)    Anesthesia: General    Operative Findings: Left hydrocelectomy performed. No complications.     Estimated Blood Loss: min         Specimens:   Specimen (24h ago, onward)       Start     Ordered    03/11/25 0938  Specimen to Pathology, Surgery Urology  Once        Comments: Pre-op Diagnosis: Left testicular pain [N50.812]Procedure(s):EXCISION, SPERMATOCELE Number of specimens: 1Name of specimens: 1. Hydrocele sac, left - Perm     References:    Click here for ordering Quick Tip   Question Answer Comment   Procedure Type: Urology    Specimen Class: Routine/Screening    Release to patient Immediate        03/11/25 0958                      Discharge Note    OUTCOME: Patient tolerated treatment/procedure well without complication and is now ready for discharge.    DISPOSITION: Home or Self Care    FINAL DIAGNOSIS:  same as above    FOLLOWUP: In clinic    DISCHARGE INSTRUCTIONS:  patient can be discharged home

## 2025-03-11 NOTE — TRANSFER OF CARE
"Anesthesia Transfer of Care Note    Patient: Gomez Díaz    Procedure(s) Performed: Procedure(s) (LRB):  EXCISION, SPERMATOCELE (Left)    Patient location: PACU    Anesthesia Type: general    Transport from OR: Transported from OR on 6-10 L/min O2 by face mask with adequate spontaneous ventilation    Post pain: adequate analgesia    Post assessment: no apparent anesthetic complications    Post vital signs: stable    Nausea/Vomiting: no nausea/vomiting    Complications: none    Transfer of care protocol was followed    Last vitals: Visit Vitals  BP (!) 145/87 (BP Location: Right arm, Patient Position: Lying)   Pulse 78   Temp 37.2 °C (99 °F) (Skin)   Resp 18   Ht 5' 6" (1.676 m)   Wt 103.4 kg (228 lb)   SpO2 96%   BMI 36.80 kg/m²     "

## 2025-03-11 NOTE — OP NOTE
[facility]  Operative Note    Date: 03/11/2025    Pre-Op Diagnosis: Left hydrocele    Post-Op Diagnosis: same    Procedure(s) Performed:   1.  Left hydrocelectomy    Specimen(s): hydrocele sac, left    Staff Surgeon:  Macario Bucio MD    Assistant Surgeon: José Miguel Olivarez MD    Anesthesia: General LMA anesthesia. 5cc 0.25% marcaine injected as local at end of case.     Indications: Gomez Díaz is a 54 y.o. male with a left hydrocele.  He desires surgical correction.      Findings:   Successful left hydrocelectomy using Jaboulay technique. 200 mL of fluid was drained    Estimated Blood Loss: min    Drains: none    Procedure in detail:  After risks benefits and possible complications of hydrocelectomy were explained, the patient elected to undergo the procedure and consent was obtained. All questions were answered in the pre-operative area. The patient was transferred to the operative suite and placed in supine position on the operative table.  SCDs were applied and working.  Anesthesia was administered and the patient was prepped and draped in the usual sterile fashion. Time out was performed, elie-procedural antibiotics were confirmed.     The patient's hydrocele was palpated and brought to the anterior scrotal skin. A marking pen was used to altaf a vertical incision overlying the median raphe.  A 15 blade was used to sharply incise the incision. Dissection was continued trough dartos fascia to expose the tunica vaginalis, which was then mobilized bluntly from overlying layers. A small incision was made in the tunica vaginalis and 200 mL of hydrocele fluid was drained. The incision in the tunica vaginalis was then extended inferiorly and superiorly. The testicle was then delivered through the incision. It was inspected with no abnormalities noted. The tunica vaginalis was then everted such that the cut edges could be reapproximated on the posterior aspect of the testicle. A running 2-0 vicryl suture  was used to reapproximate these edges. Bovie was used to achieve complete hemostasis. The testicle was then returned to the scrotum with care taken to ensure proper orientation.     The incision was then closed. The dartos fascia was closed with running 3-0 vicryl suture. We then injected 5cc of 0.25% marcaine. The skin was closed with 3-0 running monocryl suture. Dermabond was applied to the incision. Fluffs and scrotal support were applied.    The patient tolerated the procedure well and was transferred to the PACU in stable condition    Disposition:  The patient will follow up in 4-6 weeks. He was instructed to avoid heavy lifting x 2 weeks, ice his scrotum x 48 hours and wear scrotal support x 2 weeks.      José Miguel Olivarez MD  Urology  [facility]

## 2025-03-11 NOTE — ANESTHESIA POSTPROCEDURE EVALUATION
Anesthesia Post Evaluation    Patient: Gomez Díaz    Procedure(s) Performed: Procedure(s) (LRB):  HYDROCELECTOMY (Left)    Final Anesthesia Type: general      Patient location during evaluation: PACU  Patient participation: Yes- Able to Participate  Level of consciousness: awake and alert and oriented  Post-procedure vital signs: reviewed and stable  Pain management: adequate  Airway patency: patent    PONV status at discharge: No PONV  Anesthetic complications: no      Cardiovascular status: hemodynamically stable  Respiratory status: unassisted  Hydration status: euvolemic  Follow-up not needed.              Vitals Value Taken Time   /87 03/11/25 10:57   Temp 36.6 °C (97.9 °F) 03/11/25 10:55   Pulse 85 03/11/25 11:00   Resp 17 03/11/25 10:55   SpO2 95 % 03/11/25 11:00   Vitals shown include unfiled device data.      Event Time   Out of Recovery 10:58:35         Pain/Suzette Score: Pain Rating Prior to Med Admin: 4 (3/11/2025 10:35 AM)  Pain Rating Post Med Admin: 0 (3/11/2025 10:55 AM)  Suzette Score: 9 (3/11/2025 10:55 AM)

## 2025-03-13 LAB
FINAL PATHOLOGIC DIAGNOSIS: NORMAL
GROSS: NORMAL
Lab: NORMAL

## 2025-03-17 ENCOUNTER — PATIENT MESSAGE (OUTPATIENT)
Dept: UROLOGY | Facility: CLINIC | Age: 55
End: 2025-03-17
Payer: COMMERCIAL

## 2025-04-14 ENCOUNTER — OFFICE VISIT (OUTPATIENT)
Dept: UROLOGY | Facility: CLINIC | Age: 55
End: 2025-04-14
Payer: COMMERCIAL

## 2025-04-14 VITALS
WEIGHT: 225.44 LBS | SYSTOLIC BLOOD PRESSURE: 141 MMHG | HEART RATE: 87 BPM | DIASTOLIC BLOOD PRESSURE: 87 MMHG | HEIGHT: 66 IN | BODY MASS INDEX: 36.23 KG/M2

## 2025-04-14 DIAGNOSIS — N43.3 HYDROCELE IN ADULT: Primary | ICD-10-CM

## 2025-04-14 DIAGNOSIS — R10.30 INGUINAL PAIN, UNSPECIFIED LATERALITY: ICD-10-CM

## 2025-04-14 PROCEDURE — 1160F RVW MEDS BY RX/DR IN RCRD: CPT | Mod: CPTII,S$GLB,, | Performed by: UROLOGY

## 2025-04-14 PROCEDURE — 1159F MED LIST DOCD IN RCRD: CPT | Mod: CPTII,S$GLB,, | Performed by: UROLOGY

## 2025-04-14 PROCEDURE — 99024 POSTOP FOLLOW-UP VISIT: CPT | Mod: S$GLB,,, | Performed by: UROLOGY

## 2025-04-14 PROCEDURE — 3079F DIAST BP 80-89 MM HG: CPT | Mod: CPTII,S$GLB,, | Performed by: UROLOGY

## 2025-04-14 PROCEDURE — 3008F BODY MASS INDEX DOCD: CPT | Mod: CPTII,S$GLB,, | Performed by: UROLOGY

## 2025-04-14 PROCEDURE — 99999 PR PBB SHADOW E&M-EST. PATIENT-LVL IV: CPT | Mod: PBBFAC,,, | Performed by: UROLOGY

## 2025-04-14 PROCEDURE — 3077F SYST BP >= 140 MM HG: CPT | Mod: CPTII,S$GLB,, | Performed by: UROLOGY

## 2025-04-14 NOTE — PROGRESS NOTES
[facility] - Urology   Clinic Note    SUBJECTIVE:     Chief Complaint   Patient presents with    Post-op Evaluation       Referral from: No ref. provider found.    History of Present Illness:  Gomez Díaz is a 54 y.o. male who presents to clinic for left orchalgia.    Patient presents with complaints of left testicular pain for several months.  Describes pain as dull and achy but occasionally sharp.  The pain waxes and wanes in intensity and is intermittent.  Denies any issues with erections.  Denies any high-risk sexual activity.  Denies any dysuria or significantly bothersome urinary complaints. Takes flomax. Previously had a scrotal US which revealed bilateral spermatoceles    Patient endorses no additional complaints at this time.    4/14/2025   Had left hydrocelectomy on 3/11/25.  Here for post-op check.  Complains of worsening swelling of the left testicle.  He says the swelling in the left testicle never improved after surgery.   Denies any pain.      Past Medical History:   Diagnosis Date    Allergy     Hypertension        Past Surgical History:   Procedure Laterality Date    CARPAL TUNNEL RELEASE Left     EXCISION OF HYDROCELE Left 3/11/2025    Procedure: HYDROCELECTOMY;  Surgeon: Macario Bucio MD;  Location: [facility];  Service: Urology;  Laterality: Left;       No family history on file.    Social History     Tobacco Use    Smoking status: Never     Passive exposure: Never   Substance Use Topics    Alcohol use: Never    Drug use: Never       Current Outpatient Medications on File Prior to Visit   Medication Sig Dispense Refill    allopurinol (ZYLOPRIM) 100 MG tablet Take 100 mg by mouth once daily.      allopurinoL (ZYLOPRIM) 300 MG tablet Take 300 mg by mouth.      amLODIPine (NORVASC) 10 MG tablet amlodipine 10 mg tablet      indomethacin (INDOCIN) 50 MG capsule TAKE ONE CAPSULE BY MOUTH THREE TIMES DAILY AFTER A MEAL      multivitamin (MEN'S MULTI-VITAMIN) per tablet Take 1 tablet by mouth once daily.    "   pantoprazole (PROTONIX) 40 MG tablet pantoprazole 40 mg tablet,delayed release   TAKE 1 TABLET BY MOUTH EVERY DAY      oxyCODONE (ROXICODONE) 5 MG immediate release tablet Take 1 tablet (5 mg total) by mouth every 4 (four) hours as needed for Pain (last resort pain). 10 tablet 0     No current facility-administered medications on file prior to visit.       Review of patient's allergies indicates:   Allergen Reactions    Lisinopril Swelling       Review of Systems:  A review of 10+ systems was conducted with pertinent positive and negative findings documented in HPI with all other systems reviewed and negative.    OBJECTIVE:     Estimated body mass index is 36.38 kg/m² as calculated from the following:    Height as of this encounter: 5' 6" (1.676 m).    Weight as of this encounter: 102.3 kg (225 lb 6.7 oz).    Vital Signs (Most Recent)  Vitals:    04/14/25 1315   BP: (!) 141/87   Pulse: 87         Physical Exam:  GENERAL: patient sitting comfortably  HEENT: normocephalic  NECK: supple, no JVD  PULM: normal chest rise, no increased WOB  HEART: non-diaphoretic  ABDO: soft, nondistended, nontender  BACK: no CVA tenderness bilaterally  SKIN: warm, dry, well perfused  EXT: no bruising or edema  NEURO: grossly normal with no focal deficits  PSYCH: appropriate mood and affect    Genitourinary Exam:  A large left hydrocele or spermatocele is present.  It is approximately the size of a large orange.  A small right hydrocele or spermatocele is present but significantly smaller than left.    Lab Results   Component Value Date    BUN 12 02/25/2025    CREATININE 0.8 02/25/2025    WBC 6.34 04/17/2024    HGB 13.3 (L) 04/17/2024    HCT 38.6 (L) 04/17/2024     04/17/2024    AST 32 04/17/2024    ALT 48 (H) 04/17/2024    ALKPHOS 54 (L) 04/17/2024    ALBUMIN 4.1 04/17/2024    HGBA1C 5.6 06/10/2022        Imaging:  I have personally reviewed all relevant imaging studies.    No results found for this or any previous visit (from " "the past 2160 hours).  No results found for this or any previous visit (from the past 2160 hours).  US Scrotum And Testicles  Narrative: EXAMINATION:  US SCROTUM AND TESTICLES    CLINICAL HISTORY:  Testicular pain, unspecified    TECHNIQUE:  Sonography of the scrotum and testes.    COMPARISON:  Ultrasound from 01/21/2019    FINDINGS:  Right Testicle:    *Size: 4.2 x 2.8 x 2.5 cm  *Appearance: Normal.  *Flow: Normal arterial and venous flow.  Increased perfusion.  *Epididymis: Normal.  *Spermatocele: Large similar to prior exam  *Varicocele: None.  .    Left Testicle:    *Size: 4.6 x 2.1 x 2.3 cm  *Appearance: Normal.  *Flow: Normal arterial and venous flow.  Increased perfusion.  *Epididymis: Normal.  *Spermatocele: Large similar to prior exam.  *Varicocele: None.  .    Other findings: Right testicular appendage measuring 0.6 cm.  Impression: Increased perfusion of the testicles bilaterally concerning for acute bilateral orchitis.    Large bilateral spermatoceles.    Electronically signed by: Arthur Anne MD  Date:    05/01/2024  Time:    12:05       PSA:  Lab Results   Component Value Date    PSA 0.43 04/17/2024    PSA 0.34 07/06/2023    PSA 0.48 06/10/2022    PSA 0.40 05/27/2021    PSA 0.32 03/03/2020    PSA 0.30 02/13/2019    PSA 0.39 04/25/2018    PSA 0.36 03/27/2017    PSATOTAL 0.6 02/05/2016       Testosterone:  No results found for: "TOTALTESTOST", "TESTOSTERONE"     ASSESSMENT     1. Hydrocele in adult          PLAN:     Will obtain scrotal US and left inguinal US due to hydrocele persistence s/p L hydrocelectomy.    Macario Bucio MD  Urology  [facility]    Disclaimer: This note has been generated using voice-recognition software. There may be typographical errors that have been missed during proof-reading.       "

## 2025-04-21 ENCOUNTER — HOSPITAL ENCOUNTER (OUTPATIENT)
Dept: RADIOLOGY | Facility: HOSPITAL | Age: 55
Discharge: HOME | End: 2025-04-21
Attending: UROLOGY
Payer: COMMERCIAL

## 2025-04-21 ENCOUNTER — RESULTS FOLLOW-UP (OUTPATIENT)
Dept: UROLOGY | Facility: CLINIC | Age: 55
End: 2025-04-21

## 2025-04-21 ENCOUNTER — PATIENT MESSAGE (OUTPATIENT)
Dept: UROLOGY | Facility: CLINIC | Age: 55
End: 2025-04-21
Payer: COMMERCIAL

## 2025-04-21 DIAGNOSIS — N43.3 HYDROCELE IN ADULT: ICD-10-CM

## 2025-04-21 PROCEDURE — 76882 US LMTD JT/FCL EVL NVASC XTR: CPT | Mod: 26,RT,, | Performed by: RADIOLOGY

## 2025-04-21 PROCEDURE — 76882 US LMTD JT/FCL EVL NVASC XTR: CPT | Mod: TC,LT

## 2025-04-21 PROCEDURE — 76882 US LMTD JT/FCL EVL NVASC XTR: CPT | Mod: TC,RT

## 2025-04-21 PROCEDURE — 76870 US EXAM SCROTUM: CPT | Mod: 26,,, | Performed by: RADIOLOGY

## 2025-04-21 PROCEDURE — 76870 US EXAM SCROTUM: CPT | Mod: TC

## 2025-05-12 ENCOUNTER — OFFICE VISIT (OUTPATIENT)
Dept: UROLOGY | Facility: CLINIC | Age: 55
End: 2025-05-12
Payer: COMMERCIAL

## 2025-05-12 VITALS — SYSTOLIC BLOOD PRESSURE: 136 MMHG | DIASTOLIC BLOOD PRESSURE: 89 MMHG | HEART RATE: 89 BPM

## 2025-05-12 DIAGNOSIS — R10.30 INGUINAL PAIN, UNSPECIFIED LATERALITY: Primary | ICD-10-CM

## 2025-05-12 DIAGNOSIS — R10.9 ABDOMINAL PAIN, UNSPECIFIED ABDOMINAL LOCATION: ICD-10-CM

## 2025-05-12 DIAGNOSIS — N50.89 SCROTAL SWELLING: ICD-10-CM

## 2025-05-12 PROCEDURE — 99999 PR PBB SHADOW E&M-EST. PATIENT-LVL III: CPT | Mod: PBBFAC,,, | Performed by: UROLOGY

## 2025-05-12 NOTE — PROGRESS NOTES
[facility] Urology   Clinic Note    SUBJECTIVE:     No chief complaint on file.      Referral from: No ref. provider found.    History of Present Illness:  Gomez Díaz is a 55 y.o. male who presents to clinic for left orchalgia.    Patient presents with complaints of left testicular pain for several months.  Describes pain as dull and achy but occasionally sharp.  The pain waxes and wanes in intensity and is intermittent.  Denies any issues with erections.  Denies any high-risk sexual activity.  Denies any dysuria or significantly bothersome urinary complaints. Takes flomax. Previously had a scrotal US which revealed bilateral spermatoceles    Patient endorses no additional complaints at this time.    4/14/2025   Had left hydrocelectomy on 3/11/25.  Here for post-op check.  Complains of worsening swelling of the left testicle.  He says the swelling in the left testicle never improved after surgery.   Denies any pain.      5/12/2025  Here for f/u. Prior US at last visit showed a complicated cyst of the left epididymis and fluid collection superior to the left testicle.   Today he c/o worsening swelling of his scrotum. Does not appear to be infectious or a hematoma. Appears to be a hydrocele on PE. Not painful but uncomfortable.     Past Medical History:   Diagnosis Date    Allergy     Hypertension        Past Surgical History:   Procedure Laterality Date    CARPAL TUNNEL RELEASE Left     EXCISION OF HYDROCELE Left 3/11/2025    Procedure: HYDROCELECTOMY;  Surgeon: Macario Bucio MD;  Location: [facility];  Service: Urology;  Laterality: Left;       No family history on file.    Social History     Tobacco Use    Smoking status: Never     Passive exposure: Never   Substance Use Topics    Alcohol use: Never    Drug use: Never       Current Outpatient Medications on File Prior to Visit   Medication Sig Dispense Refill    allopurinol (ZYLOPRIM) 100 MG tablet Take 100 mg by mouth once daily.      allopurinoL (ZYLOPRIM) 300 MG  "tablet Take 300 mg by mouth.      amLODIPine (NORVASC) 10 MG tablet amlodipine 10 mg tablet      indomethacin (INDOCIN) 50 MG capsule TAKE ONE CAPSULE BY MOUTH THREE TIMES DAILY AFTER A MEAL      multivitamin (MEN'S MULTI-VITAMIN) per tablet Take 1 tablet by mouth once daily.      pantoprazole (PROTONIX) 40 MG tablet pantoprazole 40 mg tablet,delayed release   TAKE 1 TABLET BY MOUTH EVERY DAY      oxyCODONE (ROXICODONE) 5 MG immediate release tablet Take 1 tablet (5 mg total) by mouth every 4 (four) hours as needed for Pain (last resort pain). (Patient not taking: Reported on 5/12/2025) 10 tablet 0     No current facility-administered medications on file prior to visit.       Review of patient's allergies indicates:   Allergen Reactions    Lisinopril Swelling       Review of Systems:  A review of 10+ systems was conducted with pertinent positive and negative findings documented in HPI with all other systems reviewed and negative.    OBJECTIVE:     Estimated body mass index is 36.38 kg/m² as calculated from the following:    Height as of 4/14/25: 5' 6" (1.676 m).    Weight as of 4/14/25: 102.3 kg (225 lb 6.7 oz).    Vital Signs (Most Recent)  Vitals:    05/12/25 1026   BP: 136/89   Pulse: 89         Physical Exam:  GENERAL: patient sitting comfortably  HEENT: normocephalic  NECK: supple, no JVD  PULM: normal chest rise, no increased WOB  HEART: non-diaphoretic  ABDO: soft, nondistended, nontender  BACK: no CVA tenderness bilaterally  SKIN: warm, dry, well perfused  EXT: no bruising or edema  NEURO: grossly normal with no focal deficits  PSYCH: appropriate mood and affect    Genitourinary Exam:  A large left hydrocele or spermatocele is present.  It is approximately the size of a large orange.  A small right hydrocele or spermatocele is present but significantly smaller than left.    Lab Results   Component Value Date    BUN 12 02/25/2025    CREATININE 0.8 02/25/2025    WBC 6.34 04/17/2024    HGB 13.3 (L) 04/17/2024 "    HCT 38.6 (L) 04/17/2024     04/17/2024    AST 32 04/17/2024    ALT 48 (H) 04/17/2024    ALKPHOS 54 (L) 04/17/2024    ALBUMIN 4.1 04/17/2024    HGBA1C 5.6 06/10/2022        Imaging:  I have personally reviewed all relevant imaging studies.    No results found for this or any previous visit (from the past 2160 hours).  No results found for this or any previous visit (from the past 2160 hours).  US Soft Tissue, Groin Left  Narrative: EXAMINATION:  US SOFT TISSUE, GROIN LEFT    CLINICAL HISTORY:  Hydrocele, unspecified    TECHNIQUE:  Ultrasound soft tissue of the left inguinal region.    COMPARISON:  None    FINDINGS:  Fluid collection superior to the left testicle measuring 3.9 x 2.9 x 2.3 cm.  This appears separate and superior to the left testicle.  Findings are nonspecific and may represent extension of complicated hydrocele, seroma, or less likely infectious process.  Clinical correlation is recommended.  Impression: As above.    Electronically signed by: Arthur Anne MD  Date:    04/21/2025  Time:    12:42  US Soft Tissue, Groin Right  Narrative: EXAMINATION:  US SOFT TISSUE, GROIN RIGHT    CLINICAL HISTORY:  Hydrocele, unspecified    TECHNIQUE:  Ultrasound soft tissue of the right groin.  Grayscale and color flow imaging was obtained.    COMPARISON:  None    FINDINGS:  No sonographic abnormality is visualized in the right inguinal region.  Impression: As above.    Electronically signed by: Arthur Anne MD  Date:    04/21/2025  Time:    12:40  US Scrotum And Testicles  Narrative: EXAMINATION:  US SCROTUM AND TESTICLES    CLINICAL HISTORY:  Hydrocele, unspecified    TECHNIQUE:  Sonography of the scrotum and testes.    COMPARISON:  Ultrasound from 05/01/2024    FINDINGS:  Right Testicle:    *Size: 4.3 x 1.9 x 2.5 cm  *Appearance: Normal.  *Flow: Normal arterial and venous flow  *Epididymis: Normal.  *Large complicated hydrocele versus spermatocele.  *Varicocele: None.  .    Left Testicle:    *Size:  "3.6 x 2.6 x 2.7 cm  *Appearance: Normal.  *Flow: Normal arterial and venous flow  *Epididymis: Complicated cyst within the epididymis measuring 2.5 x 1.2 x 1.8 cm.  *Large complicated hydrocele versus spermatocele.  *Varicocele: None.  .    Other findings: None.  Impression: No acute sonographic abnormality.    Bilateral complicated hydroceles versus spermatoceles.    Complicated left epididymal cyst.    Electronically signed by: Arthur Anne MD  Date:    04/21/2025  Time:    12:39       PSA:  Lab Results   Component Value Date    PSA 0.43 04/17/2024    PSA 0.34 07/06/2023    PSA 0.48 06/10/2022    PSA 0.40 05/27/2021    PSA 0.32 03/03/2020    PSA 0.30 02/13/2019    PSA 0.39 04/25/2018    PSA 0.36 03/27/2017    PSATOTAL 0.6 02/05/2016       Testosterone:  No results found for: "TOTALTESTOST", "TESTOSTERONE"     ASSESSMENT     1. Inguinal pain, unspecified laterality    2. Scrotal swelling    3. Abdominal pain, unspecified abdominal location            PLAN:     Will obtain a CTRSS to assess for any abnormalities not seen on US. F/u on Monday or Thursday of next week. Will discuss options at that appointment.    Macario Bucio MD  Urology  [facility]    Disclaimer: This note has been generated using voice-recognition software. There may be typographical errors that have been missed during proof-reading.         "

## 2025-05-15 ENCOUNTER — HOSPITAL ENCOUNTER (OUTPATIENT)
Dept: RADIOLOGY | Facility: HOSPITAL | Age: 55
Discharge: HOME | End: 2025-05-15
Attending: UROLOGY
Payer: COMMERCIAL

## 2025-05-15 DIAGNOSIS — R10.9 ABDOMINAL PAIN, UNSPECIFIED ABDOMINAL LOCATION: ICD-10-CM

## 2025-05-15 PROCEDURE — 74176 CT ABD & PELVIS W/O CONTRAST: CPT | Mod: 26,,, | Performed by: RADIOLOGY

## 2025-05-15 PROCEDURE — 74176 CT ABD & PELVIS W/O CONTRAST: CPT | Mod: TC

## 2025-05-19 ENCOUNTER — OFFICE VISIT (OUTPATIENT)
Dept: UROLOGY | Facility: CLINIC | Age: 55
End: 2025-05-19
Payer: COMMERCIAL

## 2025-05-19 VITALS
DIASTOLIC BLOOD PRESSURE: 81 MMHG | SYSTOLIC BLOOD PRESSURE: 124 MMHG | HEART RATE: 82 BPM | BODY MASS INDEX: 36.79 KG/M2 | WEIGHT: 228.94 LBS | HEIGHT: 66 IN

## 2025-05-19 DIAGNOSIS — N43.3 BILATERAL HYDROCELE: ICD-10-CM

## 2025-05-19 DIAGNOSIS — N43.3 HYDROCELE IN ADULT: Primary | ICD-10-CM

## 2025-05-19 PROCEDURE — 3079F DIAST BP 80-89 MM HG: CPT | Mod: CPTII,S$GLB,, | Performed by: UROLOGY

## 2025-05-19 PROCEDURE — 3074F SYST BP LT 130 MM HG: CPT | Mod: CPTII,S$GLB,, | Performed by: UROLOGY

## 2025-05-19 PROCEDURE — 1159F MED LIST DOCD IN RCRD: CPT | Mod: CPTII,S$GLB,, | Performed by: UROLOGY

## 2025-05-19 PROCEDURE — 3008F BODY MASS INDEX DOCD: CPT | Mod: CPTII,S$GLB,, | Performed by: UROLOGY

## 2025-05-19 PROCEDURE — 99999 PR PBB SHADOW E&M-EST. PATIENT-LVL IV: CPT | Mod: PBBFAC,,, | Performed by: UROLOGY

## 2025-05-19 PROCEDURE — 99024 POSTOP FOLLOW-UP VISIT: CPT | Mod: S$GLB,,, | Performed by: UROLOGY

## 2025-05-19 PROCEDURE — 1160F RVW MEDS BY RX/DR IN RCRD: CPT | Mod: CPTII,S$GLB,, | Performed by: UROLOGY

## 2025-05-19 RX ORDER — LIDOCAINE HYDROCHLORIDE 10 MG/ML
1 INJECTION, SOLUTION EPIDURAL; INFILTRATION; INTRACAUDAL; PERINEURAL ONCE
OUTPATIENT
Start: 2025-05-19 | End: 2025-05-19

## 2025-05-19 RX ORDER — ACETAMINOPHEN 500 MG
1000 TABLET ORAL
OUTPATIENT
Start: 2025-05-19

## 2025-05-19 RX ORDER — CEFAZOLIN SODIUM 2 G/50ML
2 SOLUTION INTRAVENOUS
OUTPATIENT
Start: 2025-05-19

## 2025-05-19 NOTE — PROGRESS NOTES
[facility] - Urology   Clinic Note    SUBJECTIVE:     Chief Complaint   Patient presents with    Follow-up    Results       Referral from: No ref. provider found.    History of Present Illness:  Gomez Díaz is a 55 y.o. male who presents to clinic for left orchalgia.    Patient presents with complaints of left testicular pain for several months.  Describes pain as dull and achy but occasionally sharp.  The pain waxes and wanes in intensity and is intermittent.  Denies any issues with erections.  Denies any high-risk sexual activity.  Denies any dysuria or significantly bothersome urinary complaints. Takes flomax. Previously had a scrotal US which revealed bilateral spermatoceles    Patient endorses no additional complaints at this time.    4/14/2025   Had left hydrocelectomy on 3/11/25.  Here for post-op check.  Complains of worsening swelling of the left testicle.  He says the swelling in the left testicle never improved after surgery.   Denies any pain.      5/12/2025  Here for f/u. Prior US at last visit showed a complicated cyst of the left epididymis and fluid collection superior to the left testicle.   Today he c/o worsening swelling of his scrotum. Does not appear to be infectious or a hematoma. Appears to be a hydrocele on PE. Not painful but uncomfortable.     5/19/25  Here today for f/u after CT. Bilateral hydrocele, small non-obstructing stones. Still very uncomfortable.    Past Medical History:   Diagnosis Date    Allergy     Hypertension        Past Surgical History:   Procedure Laterality Date    CARPAL TUNNEL RELEASE Left     EXCISION OF HYDROCELE Left 3/11/2025    Procedure: HYDROCELECTOMY;  Surgeon: Macario Bucio MD;  Location: [facility];  Service: Urology;  Laterality: Left;       No family history on file.    Social History     Tobacco Use    Smoking status: Never     Passive exposure: Never   Substance Use Topics    Alcohol use: Never    Drug use: Never       Current Outpatient Medications on File  "Prior to Visit   Medication Sig Dispense Refill    allopurinol (ZYLOPRIM) 100 MG tablet Take 100 mg by mouth once daily.      allopurinoL (ZYLOPRIM) 300 MG tablet Take 300 mg by mouth.      amLODIPine (NORVASC) 10 MG tablet amlodipine 10 mg tablet      indomethacin (INDOCIN) 50 MG capsule TAKE ONE CAPSULE BY MOUTH THREE TIMES DAILY AFTER A MEAL      multivitamin (MEN'S MULTI-VITAMIN) per tablet Take 1 tablet by mouth once daily.      pantoprazole (PROTONIX) 40 MG tablet pantoprazole 40 mg tablet,delayed release   TAKE 1 TABLET BY MOUTH EVERY DAY      oxyCODONE (ROXICODONE) 5 MG immediate release tablet Take 1 tablet (5 mg total) by mouth every 4 (four) hours as needed for Pain (last resort pain). (Patient not taking: Reported on 5/19/2025) 10 tablet 0     No current facility-administered medications on file prior to visit.       Review of patient's allergies indicates:   Allergen Reactions    Lisinopril Swelling       Review of Systems:  A review of 10+ systems was conducted with pertinent positive and negative findings documented in HPI with all other systems reviewed and negative.    OBJECTIVE:     Estimated body mass index is 36.95 kg/m² as calculated from the following:    Height as of this encounter: 5' 6" (1.676 m).    Weight as of this encounter: 103.8 kg (228 lb 15.2 oz).    Vital Signs (Most Recent)  Vitals:    05/19/25 1117   BP: 124/81   Pulse: 82         Physical Exam:  GENERAL: patient sitting comfortably  HEENT: normocephalic  NECK: supple, no JVD  PULM: normal chest rise, no increased WOB  HEART: non-diaphoretic  ABDO: soft, nondistended, nontender  BACK: no CVA tenderness bilaterally  SKIN: warm, dry, well perfused  EXT: no bruising or edema  NEURO: grossly normal with no focal deficits  PSYCH: appropriate mood and affect    Genitourinary Exam:  A large left hydrocele or spermatocele is present.  It is approximately the size of a large orange.  A small right hydrocele or spermatocele is present but " significantly smaller than left.    Lab Results   Component Value Date    BUN 12 02/25/2025    CREATININE 0.8 02/25/2025    WBC 6.34 04/17/2024    HGB 13.3 (L) 04/17/2024    HCT 38.6 (L) 04/17/2024     04/17/2024    AST 32 04/17/2024    ALT 48 (H) 04/17/2024    ALKPHOS 54 (L) 04/17/2024    ALBUMIN 4.1 04/17/2024    HGBA1C 5.6 06/10/2022        Imaging:  I have personally reviewed all relevant imaging studies.    Results for orders placed or performed during the hospital encounter of 05/15/25 (from the past 2160 hours)   CT Renal Stone Study ABD Pelvis WO    Narrative    EXAMINATION:  CT RENAL STONE STUDY ABD PELVIS WO    CLINICAL HISTORY:  Flank pain, kidney stone suspected; Unspecified abdominal pain    TECHNIQUE:  Low dose axial images, sagittal and coronal reformations were obtained from the lung bases to the pubic symphysis.  Contrast was not administered.    COMPARISON:  Chest CT 02/19/2013    FINDINGS:  The imaged portions of the lung bases and heart show no significant abnormalities.  Small Peggy fissural nodule on the right can be seen on studies dating back to 2013 likely benign.    There is diffuse low-attenuation of the liver suggesting hepatic steatosis with fatty sparing near the gallbladder fossa.  No suspicious liver lesions detected on noncontrast imaging.    Stomach, pancreas, and adrenal glands show no significant abnormalities.  Spleen is mildly enlarged.    Gallbladder shows no radiopaque stones or inflammatory changes.  No biliary ductal dilatation.    Kidneys are normal in size, contour and location.  Small right renal hypodensities difficult to fully characterize but likely small cysts.  There are punctate renal stones best visualized on coronal images.  There are 2 small (2-3 mm) stones in the right kidney and a left midpole punctate stone measuring approximately 2 mm.  There are no ureteral stones or hydronephrosis.  Urinary bladder is partially distended and otherwise unremarkable.   There are prostate calcifications.  Prostate gland is normal in size.  There are bilateral scrotal hydroceles.    The small and large bowel show no acute inflammation or obstruction.  No free air or ascites.  Appendix is normal.    Abdominal aorta is normal in caliber with mild atherosclerosis.    No abnormal lymph node enlargement.    Osseous structures show no acute abnormalities.      Impression    Small bilateral renal stones.  No ureteral stones or hydronephrosis.    Bilateral scrotal hydroceles.    Diffuse hepatic steatosis.    Mild splenomegaly.      Electronically signed by: Xiang Barnett MD  Date:    05/16/2025  Time:    11:55     No results found for this or any previous visit (from the past 2160 hours).  CT Renal Stone Study ABD Pelvis WO  Narrative: EXAMINATION:  CT RENAL STONE STUDY ABD PELVIS WO    CLINICAL HISTORY:  Flank pain, kidney stone suspected; Unspecified abdominal pain    TECHNIQUE:  Low dose axial images, sagittal and coronal reformations were obtained from the lung bases to the pubic symphysis.  Contrast was not administered.    COMPARISON:  Chest CT 02/19/2013    FINDINGS:  The imaged portions of the lung bases and heart show no significant abnormalities.  Small Peggy fissural nodule on the right can be seen on studies dating back to 2013 likely benign.    There is diffuse low-attenuation of the liver suggesting hepatic steatosis with fatty sparing near the gallbladder fossa.  No suspicious liver lesions detected on noncontrast imaging.    Stomach, pancreas, and adrenal glands show no significant abnormalities.  Spleen is mildly enlarged.    Gallbladder shows no radiopaque stones or inflammatory changes.  No biliary ductal dilatation.    Kidneys are normal in size, contour and location.  Small right renal hypodensities difficult to fully characterize but likely small cysts.  There are punctate renal stones best visualized on coronal images.  There are 2 small (2-3 mm) stones in the right  "kidney and a left midpole punctate stone measuring approximately 2 mm.  There are no ureteral stones or hydronephrosis.  Urinary bladder is partially distended and otherwise unremarkable.  There are prostate calcifications.  Prostate gland is normal in size.  There are bilateral scrotal hydroceles.    The small and large bowel show no acute inflammation or obstruction.  No free air or ascites.  Appendix is normal.    Abdominal aorta is normal in caliber with mild atherosclerosis.    No abnormal lymph node enlargement.    Osseous structures show no acute abnormalities.  Impression: Small bilateral renal stones.  No ureteral stones or hydronephrosis.    Bilateral scrotal hydroceles.    Diffuse hepatic steatosis.    Mild splenomegaly.    Electronically signed by: Xiang Barnett MD  Date:    05/16/2025  Time:    11:55       PSA:  Lab Results   Component Value Date    PSA 0.43 04/17/2024    PSA 0.34 07/06/2023    PSA 0.48 06/10/2022    PSA 0.40 05/27/2021    PSA 0.32 03/03/2020    PSA 0.30 02/13/2019    PSA 0.39 04/25/2018    PSA 0.36 03/27/2017    PSATOTAL 0.6 02/05/2016       Testosterone:  No results found for: "TOTALTESTOST", "TESTOSTERONE"     ASSESSMENT     1. Hydrocele in adult    2. Bilateral hydrocele              PLAN:     Plan for bilateral hydrocelectomy. Risks vs benefits discussed.    Macario Bucio MD  Urology  [facility]    Disclaimer: This note has been generated using voice-recognition software. There may be typographical errors that have been missed during proof-reading.           "

## 2025-05-19 NOTE — H&P (VIEW-ONLY)
[place] - Urology   Clinic Note    SUBJECTIVE:     Chief Complaint   Patient presents with    Follow-up    Results       Referral from: No ref. provider found.    History of Present Illness:  Gomez Díaz is a 55 y.o. male who presents to clinic for left orchalgia.    Patient presents with complaints of left testicular pain for several months.  Describes pain as dull and achy but occasionally sharp.  The pain waxes and wanes in intensity and is intermittent.  Denies any issues with erections.  Denies any high-risk sexual activity.  Denies any dysuria or significantly bothersome urinary complaints. Takes flomax. Previously had a scrotal US which revealed bilateral spermatoceles    Patient endorses no additional complaints at this time.    4/14/2025   Had left hydrocelectomy on 3/11/25.  Here for post-op check.  Complains of worsening swelling of the left testicle.  He says the swelling in the left testicle never improved after surgery.   Denies any pain.      5/12/2025  Here for f/u. Prior US at last visit showed a complicated cyst of the left epididymis and fluid collection superior to the left testicle.   Today he c/o worsening swelling of his scrotum. Does not appear to be infectious or a hematoma. Appears to be a hydrocele on PE. Not painful but uncomfortable.     5/19/25  Here today for f/u after CT. Bilateral hydrocele, small non-obstructing stones. Still very uncomfortable.    Past Medical History:   Diagnosis Date    Allergy     Hypertension        Past Surgical History:   Procedure Laterality Date    CARPAL TUNNEL RELEASE Left     EXCISION OF HYDROCELE Left 3/11/2025    Procedure: HYDROCELECTOMY;  Surgeon: Macario Bucio MD;  Location: [facility];  Service: Urology;  Laterality: Left;       No family history on file.    Social History     Tobacco Use    Smoking status: Never     Passive exposure: Never   Substance Use Topics    Alcohol use: Never    Drug use: Never       Current Outpatient Medications on File  "Prior to Visit   Medication Sig Dispense Refill    allopurinol (ZYLOPRIM) 100 MG tablet Take 100 mg by mouth once daily.      allopurinoL (ZYLOPRIM) 300 MG tablet Take 300 mg by mouth.      amLODIPine (NORVASC) 10 MG tablet amlodipine 10 mg tablet      indomethacin (INDOCIN) 50 MG capsule TAKE ONE CAPSULE BY MOUTH THREE TIMES DAILY AFTER A MEAL      multivitamin (MEN'S MULTI-VITAMIN) per tablet Take 1 tablet by mouth once daily.      pantoprazole (PROTONIX) 40 MG tablet pantoprazole 40 mg tablet,delayed release   TAKE 1 TABLET BY MOUTH EVERY DAY      oxyCODONE (ROXICODONE) 5 MG immediate release tablet Take 1 tablet (5 mg total) by mouth every 4 (four) hours as needed for Pain (last resort pain). (Patient not taking: Reported on 5/19/2025) 10 tablet 0     No current facility-administered medications on file prior to visit.       Review of patient's allergies indicates:   Allergen Reactions    Lisinopril Swelling       Review of Systems:  A review of 10+ systems was conducted with pertinent positive and negative findings documented in HPI with all other systems reviewed and negative.    OBJECTIVE:     Estimated body mass index is 36.95 kg/m² as calculated from the following:    Height as of this encounter: 5' 6" (1.676 m).    Weight as of this encounter: 103.8 kg (228 lb 15.2 oz).    Vital Signs (Most Recent)  Vitals:    05/19/25 1117   BP: 124/81   Pulse: 82         Physical Exam:  GENERAL: patient sitting comfortably  HEENT: normocephalic  NECK: supple, no JVD  PULM: normal chest rise, no increased WOB  HEART: non-diaphoretic  ABDO: soft, nondistended, nontender  BACK: no CVA tenderness bilaterally  SKIN: warm, dry, well perfused  EXT: no bruising or edema  NEURO: grossly normal with no focal deficits  PSYCH: appropriate mood and affect    Genitourinary Exam:  A large left hydrocele or spermatocele is present.  It is approximately the size of a large orange.  A small right hydrocele or spermatocele is present but " significantly smaller than left.    Lab Results   Component Value Date    BUN 12 02/25/2025    CREATININE 0.8 02/25/2025    WBC 6.34 04/17/2024    HGB 13.3 (L) 04/17/2024    HCT 38.6 (L) 04/17/2024     04/17/2024    AST 32 04/17/2024    ALT 48 (H) 04/17/2024    ALKPHOS 54 (L) 04/17/2024    ALBUMIN 4.1 04/17/2024    HGBA1C 5.6 06/10/2022        Imaging:  I have personally reviewed all relevant imaging studies.    Results for orders placed or performed during the hospital encounter of 05/15/25 (from the past 2160 hours)   CT Renal Stone Study ABD Pelvis WO    Narrative    EXAMINATION:  CT RENAL STONE STUDY ABD PELVIS WO    CLINICAL HISTORY:  Flank pain, kidney stone suspected; Unspecified abdominal pain    TECHNIQUE:  Low dose axial images, sagittal and coronal reformations were obtained from the lung bases to the pubic symphysis.  Contrast was not administered.    COMPARISON:  Chest CT 02/19/2013    FINDINGS:  The imaged portions of the lung bases and heart show no significant abnormalities.  Small Peggy fissural nodule on the right can be seen on studies dating back to 2013 likely benign.    There is diffuse low-attenuation of the liver suggesting hepatic steatosis with fatty sparing near the gallbladder fossa.  No suspicious liver lesions detected on noncontrast imaging.    Stomach, pancreas, and adrenal glands show no significant abnormalities.  Spleen is mildly enlarged.    Gallbladder shows no radiopaque stones or inflammatory changes.  No biliary ductal dilatation.    Kidneys are normal in size, contour and location.  Small right renal hypodensities difficult to fully characterize but likely small cysts.  There are punctate renal stones best visualized on coronal images.  There are 2 small (2-3 mm) stones in the right kidney and a left midpole punctate stone measuring approximately 2 mm.  There are no ureteral stones or hydronephrosis.  Urinary bladder is partially distended and otherwise unremarkable.   There are prostate calcifications.  Prostate gland is normal in size.  There are bilateral scrotal hydroceles.    The small and large bowel show no acute inflammation or obstruction.  No free air or ascites.  Appendix is normal.    Abdominal aorta is normal in caliber with mild atherosclerosis.    No abnormal lymph node enlargement.    Osseous structures show no acute abnormalities.      Impression    Small bilateral renal stones.  No ureteral stones or hydronephrosis.    Bilateral scrotal hydroceles.    Diffuse hepatic steatosis.    Mild splenomegaly.      Electronically signed by: Xiang Barnett MD  Date:    05/16/2025  Time:    11:55     No results found for this or any previous visit (from the past 2160 hours).  CT Renal Stone Study ABD Pelvis WO  Narrative: EXAMINATION:  CT RENAL STONE STUDY ABD PELVIS WO    CLINICAL HISTORY:  Flank pain, kidney stone suspected; Unspecified abdominal pain    TECHNIQUE:  Low dose axial images, sagittal and coronal reformations were obtained from the lung bases to the pubic symphysis.  Contrast was not administered.    COMPARISON:  Chest CT 02/19/2013    FINDINGS:  The imaged portions of the lung bases and heart show no significant abnormalities.  Small Peggy fissural nodule on the right can be seen on studies dating back to 2013 likely benign.    There is diffuse low-attenuation of the liver suggesting hepatic steatosis with fatty sparing near the gallbladder fossa.  No suspicious liver lesions detected on noncontrast imaging.    Stomach, pancreas, and adrenal glands show no significant abnormalities.  Spleen is mildly enlarged.    Gallbladder shows no radiopaque stones or inflammatory changes.  No biliary ductal dilatation.    Kidneys are normal in size, contour and location.  Small right renal hypodensities difficult to fully characterize but likely small cysts.  There are punctate renal stones best visualized on coronal images.  There are 2 small (2-3 mm) stones in the right  "kidney and a left midpole punctate stone measuring approximately 2 mm.  There are no ureteral stones or hydronephrosis.  Urinary bladder is partially distended and otherwise unremarkable.  There are prostate calcifications.  Prostate gland is normal in size.  There are bilateral scrotal hydroceles.    The small and large bowel show no acute inflammation or obstruction.  No free air or ascites.  Appendix is normal.    Abdominal aorta is normal in caliber with mild atherosclerosis.    No abnormal lymph node enlargement.    Osseous structures show no acute abnormalities.  Impression: Small bilateral renal stones.  No ureteral stones or hydronephrosis.    Bilateral scrotal hydroceles.    Diffuse hepatic steatosis.    Mild splenomegaly.    Electronically signed by: Xiang Barnett MD  Date:    05/16/2025  Time:    11:55       PSA:  Lab Results   Component Value Date    PSA 0.43 04/17/2024    PSA 0.34 07/06/2023    PSA 0.48 06/10/2022    PSA 0.40 05/27/2021    PSA 0.32 03/03/2020    PSA 0.30 02/13/2019    PSA 0.39 04/25/2018    PSA 0.36 03/27/2017    PSATOTAL 0.6 02/05/2016       Testosterone:  No results found for: "TOTALTESTOST", "TESTOSTERONE"     ASSESSMENT     1. Hydrocele in adult    2. Bilateral hydrocele              PLAN:     Plan for bilateral hydrocelectomy. Risks vs benefits discussed.    Macario Bucio MD  Urology  [facility]    Disclaimer: This note has been generated using voice-recognition software. There may be typographical errors that have been missed during proof-reading.           "

## 2025-06-04 ENCOUNTER — RESULTS FOLLOW-UP (OUTPATIENT)
Dept: ANESTHESIOLOGY | Facility: HOSPITAL | Age: 55
End: 2025-06-04

## 2025-06-06 ENCOUNTER — ANESTHESIA EVENT (OUTPATIENT)
Dept: SURGERY | Facility: HOSPITAL | Age: 55
End: 2025-06-06
Payer: COMMERCIAL

## 2025-06-06 NOTE — ANESTHESIA PREPROCEDURE EVALUATION
[facility]  Anesthesia Pre-Operative Evaluation         Patient Name/: Gomez Díaz, 1970  MRN: [id number]    SUBJECTIVE:     Pre-operative evaluation for Procedure(s) (LRB):  HYDROCELECTOMY (Bilateral)     2025    Gomez Díaz is a 55 y.o. male w/ a significant PMHx of bilateral hydroceles, deafness, GERD, MALINA (not using CPAP), HTN, and obesity. Patient now presents for the above procedure(s).    Patient denies any other known cardiopulmonary disease.     Functional status: Estimated >4 METS    NPO status: Reviewed, appropriate    Previous Anesthetics: Reviewed, hx of anesthetic complications (severe post-op headache following MAC and general as well as PONV)    Previous Airway:  None documented    ________________________________________  No results found for this or any previous visit.    ________________________________________    LDA: None documented.       Drips: None documented.      Problem List[1]    Review of patient's allergies indicates:   Allergen Reactions    Lisinopril Swelling       Current Inpatient Medications:       Medications Ordered Prior to Encounter[2]    Past Surgical History:   Procedure Laterality Date    CARPAL TUNNEL RELEASE Left     EXCISION OF HYDROCELE Left 3/11/2025    Procedure: HYDROCELECTOMY;  Surgeon: Macario Bucio MD;  Location: [facility];  Service: Urology;  Laterality: Left;       Social History:  Tobacco Use: Unknown (2025)    Patient History     Smoking Tobacco Use: Never     Smokeless Tobacco Use: Unknown     Passive Exposure: Never       Alcohol Use: Not At Risk (2024)    AUDIT-C     Frequency of Alcohol Consumption: 2-4 times a month     Average Number of Drinks: Patient does not drink     Frequency of Binge Drinking: Less than monthly       OBJECTIVE:     Vital Signs Range:  BMI Readings from Last 1 Encounters:   25 36.95 kg/m²               Significant Labs:        Component Value Date/Time    WBC 6.34 2024 0627     HGB 13.3 (L) 04/17/2024 0627    HCT 38.6 (L) 04/17/2024 0627     04/17/2024 0627     06/04/2025 0610     02/25/2025 0821    K 4.4 06/04/2025 0610    K 4.4 02/25/2025 0821     06/04/2025 0610     02/25/2025 0821    CO2 26 06/04/2025 0610    CO2 24 02/25/2025 0821     (H) 06/04/2025 0610     (H) 02/25/2025 0821    BUN 16 06/04/2025 0610    CREATININE 0.9 06/04/2025 0610    CALCIUM 9.8 06/04/2025 0610    CALCIUM 9.5 02/25/2025 0821    ALBUMIN 4.1 04/17/2024 0627    PROT 6.9 04/17/2024 0627    ALKPHOS 54 (L) 04/17/2024 0627    BILITOT 0.7 04/17/2024 0627    AST 32 04/17/2024 0627    ALT 48 (H) 04/17/2024 0627    HGBA1C 5.6 06/10/2022 0619        Please see Results Review for additional labs.     Diagnostic Studies: No relevant studies.    EKG:   Results for orders placed or performed in visit on 02/25/25   EKG 12-lead    Collection Time: 02/25/25  8:16 AM   Result Value Ref Range    QRS Duration 124 ms    [facility] QTC Calculation 459 ms    Narrative    Test Reason : Z01.818,I10,    Vent. Rate :  85 BPM     Atrial Rate :  85 BPM     P-R Int : 150 ms          QRS Dur : 124 ms      QT Int : 386 ms       P-R-T Axes :  40 -26  50 degrees    QTcB Int : 459 ms    Normal sinus rhythm  Nonspecific intraventricular conduction delay  Borderline Abnormal ECG  When compared with ECG of 25-Apr-2018 07:38,  No significant change was found  Confirmed by Dick Rubalcava ([id number]) on 2/26/2025 8:10:51 AM    Referred By: DANNA HERNANDEZ           Confirmed By: Dick Rubalcava       ECHO:  See subjective, if available.      ASSESSMENT/PLAN:           Pre-op Assessment    I have reviewed the Patient Summary Reports.     I have reviewed the Nursing Notes. I have reviewed the NPO Status.   I have reviewed the Medications.     Review of Systems  Anesthesia Hx:              Personal Hx of Anesthesia complications (severe post-op headache following MAC and general)), Post-Operative Nausea/Vomiting                     Social:  Non-Smoker, Social Alcohol Use       Cardiovascular:  Exercise tolerance: good   Hypertension       Denies Angina.     hyperlipidemia                         Hypertension         Pulmonary:       Denies Shortness of breath.  Sleep Apnea     Obstructive Sleep Apnea (MALINA).      Education provided regarding risk of obstructive sleep apnea            Renal/:    BPH              Hepatic/GI:     GERD, well controlled    Not Taking GLP-1 Agonists     Gerd          Musculoskeletal:  Arthritis        Arthritis          Neurological:  Denies TIA.  Denies CVA.   Headaches Denies Seizures.     Dx of Headaches   Arthritis  Peripheral Neuropathy                          Endocrine:  Denies Diabetes.         Obesity / BMI > 30      Physical Exam  General: Cooperative, Oriented, Alert and Well nourished    Airway:  Mallampati: II / II  Mouth Opening: Normal  TM Distance: 4 - 6 cm  Tongue: Large  Neck ROM: Normal ROM  Neck: Girth Increased    Dental:  Intact    Chest/Lungs:  Clear to auscultation, Normal Respiratory Rate    Heart:  Rate: Normal  Rhythm: Regular Rhythm          Anesthesia Plan  Type of Anesthesia, risks & benefits discussed:    Anesthesia Type: Gen ETT, Gen Supraglottic Airway  Intra-op Monitoring Plan: Standard ASA Monitors  Post Op Pain Control Plan: multimodal analgesia  Induction:  IV  Airway Plan: Direct and Video, Post-Induction  Informed Consent: Informed consent signed with the Patient and all parties understand the risks and agree with anesthesia plan.  All questions answered.   ASA Score: 3  Day of Surgery Review of History & Physical: H&P Update referred to the surgeon/provider.  Anesthesia Plan Notes: Anesthesia consent to be signed prior to surgery 6/18/25    FYI-Patient is very hard of hearing but does read lips. He requests medication to help prevent headache following anesthesia. Did well following surgery in March.      Ready For Surgery From Anesthesia Perspective.     .           [1]    Patient Active Problem List  Diagnosis    Dizziness and giddiness    Headache, unspecified    Sleep apnea    Arthritis    Osteoarthritis    Benign prostatic hyperplasia    Chronic cough    Essential hypertension    Gastroesophageal reflux disease    Gout    Hearing loss    Hyperlipidemia    Neuropathy    Obesity with body mass index 30 or greater    Seasonal allergies    Hydrocele in adult   [2]   No current facility-administered medications on file prior to encounter.     Current Outpatient Medications on File Prior to Encounter   Medication Sig Dispense Refill    allopurinol (ZYLOPRIM) 100 MG tablet Take 100 mg by mouth once daily.      allopurinoL (ZYLOPRIM) 300 MG tablet Take 300 mg by mouth.      amLODIPine (NORVASC) 10 MG tablet amlodipine 10 mg tablet      indomethacin (INDOCIN) 50 MG capsule TAKE ONE CAPSULE BY MOUTH THREE TIMES DAILY AFTER A MEAL      multivitamin (MEN'S MULTI-VITAMIN) per tablet Take 1 tablet by mouth once daily.      oxyCODONE (ROXICODONE) 5 MG immediate release tablet Take 1 tablet (5 mg total) by mouth every 4 (four) hours as needed for Pain (last resort pain). (Patient not taking: Reported on 5/19/2025) 10 tablet 0    pantoprazole (PROTONIX) 40 MG tablet pantoprazole 40 mg tablet,delayed release   TAKE 1 TABLET BY MOUTH EVERY DAY

## 2025-06-09 ENCOUNTER — HOSPITAL ENCOUNTER (OUTPATIENT)
Dept: PREADMISSION TESTING | Facility: HOSPITAL | Age: 55
Discharge: HOME | End: 2025-06-09
Attending: NURSE PRACTITIONER
Payer: COMMERCIAL

## 2025-06-09 ENCOUNTER — PATIENT MESSAGE (OUTPATIENT)
Dept: PREADMISSION TESTING | Facility: HOSPITAL | Age: 55
End: 2025-06-09

## 2025-06-09 NOTE — PRE-PROCEDURE INSTRUCTIONS
Wife.     Allergies, medical, surgical, family and psychosocial histories reviewed with patient. Periop plan of care reviewed. Preop instructions given, including medications to take and to hold. Hibiclens soap and instructions on use given. Time allotted for questions to be addressed.      Arrival time 0700.      Please take Amlodipine and Pantoprazole the morning of surgery.     Surgery instructions reviewed and surgery booklet sent or emailed to the patient via the portal.

## 2025-06-09 NOTE — DISCHARGE INSTRUCTIONS
Your surgery is scheduled for 6/18/25.    Please report to Hospital Front Lobby on the 1st Floor at 700 a.m.    THIS TIME IS SUBJECT TO CHANGE.  YOU WILL RECEIVE A PHONE CALL THE DAY BEFORE SURGERY BY 3:30 PM TO CONFIRM YOUR TIME OF ARRIVAL.  IF YOU HAVE NOT RECEIVED A PHONE CALL BY 3:30 PM THE DAY BEFORE YOUR SURGERY PLEASE CALL [phone]     INSTRUCTIONS IMPORTANT!!!  ¨Stop full meals 8 hours prior to arrival, but you can consume clear liquids up to 2 hours prior to arrival time. Clear liquids include Gatorade, water, soda, black coffee or tea (no milk or creamer), and clear juices only.     Please take Amlodipine and Pantoprazole the morning of surgery.         ____  Do not wear makeup, including mascara.  ____  No powder, lotions or creams to surgical area.  ____  Please remove all jewelry, including piercings and leave at home.  ____  No money or valuables needed. Please leave at home.  ____  Please bring any documents given by your doctor.  ____  If going home the same day, arrange for a ride home. You will not be able to             drive if Anesthesia was used.  ____  Children under 18 years require a parent / guardian present the entire time             they are in surgery / recovery.  ____  Wear loose fitting clothing. Allow for dressings, bandages.  ____  Stop Aspirin and blood thinners as directed by prescribing provider.  ____  Do not take any herbal, vitamins, and or supplements 2 weeks prior to surgery.  ____  Stop NSAIDS (Naproxen, Aleve, Voltaren, Celebrex, Melxoicam), Goody's, and BC powder 7 days prior to surgery.  ____  Wash the surgical area with Hibiclens or Dial Antibacterial bar soap the night before surgery, and again the             morning of surgery.  Be sure to rinse hibiclens or Dial Antibacterial bar soap off completely (if instructed by   nurse).  ____  If you take diabetic medication including Metformin, Glimepiride, Glipizide, Glyburide, Byetta, Januvia, Actos, do not take am  of surgery unless            instructed by Doctor.  ____  Call MD for temperature above 101 degrees or any other signs of infection such as Urinary (bladder) infection, Upper respiratory infection, skin boils,             etc.  ____ Do Not wear your contact lenses the day of your procedure.  You may wear your glasses.      ____ Do not shave surgical site for 3 days prior to surgery.  ____ Practice Good hand washing before, during, and after procedure.  ____ Hold the following medication for 3 days prior to surgery:    Invokana (Canagliflozin)     Synjardy XR (jardiance + metformin)  Farxiga (Dapagliflozin)     Segluroment (steglarto + metformin)  Jardiance (Empagliflozin)     Qtern (farxiga + saxagliptin)   Steglatro (Ertugliflozin)     Glyxambi (jardiance + linagliptin)  Benzavvy (Bexagliflozin)     Steglujan (steglarto + sitagliptin)  Inpefa (Sotagliflozin)      Xigduo (farxiga + metformin)   Invokamet/Invokamet XR (invokana + Metformin)       I have read or had read and explained to me, and understand the above information.  Additional comments or instructions:  For additional questions call [phone]      ANESTHESIA SIDE EFFECTS  -For the first 24 hours after surgery:  Do not drive, use heavy equipment, make important decisions, or drink alcohol  -It is normal to feel sleepy for several hours.  Rest until you are more awake.  -Have someone stay with you, if needed.  They can watch for problems and help keep you safe.  -Some possible post anesthesia side effects include: nausea and vomiting, sore throat and hoarseness, sleepiness, and dizziness.        Pre-Op Bathing Instructions    Before surgery, you can play an important role in your own health.    Because skin is not sterile, we need to be sure that your skin is as free of germs as possible. By following the instructions below, you can reduce the number of germs on your skin before surgery.    IMPORTANT: You will need to shower with a special soap called  Hibiclens*, available at any pharmacy.  If you are allergic to Chlorhexidine (the antiseptic in Hibiclens), use an antibacterial soap such as Dial Soap for your preoperative shower.  You will shower with Hibiclens both the night before your surgery and the morning of your surgery.  Do not use Hibiclens on the head, face or genitals to avoid injury to those areas.    STEP #1: THE NIGHT BEFORE YOUR SURGERY     Do not shave the area of your body where your surgery will be performed.  Shower and wash your hair and body as usual with your normal soap and shampoo.  Rinse your hair and body thoroughly after you shower to remove all soap residue.  With your hand, apply one packet of Hibiclens soap to the surgical site.   Wash the site gently for five (5) minutes. Do not scrub your skin too hard.   Do not wash with your regular soap after Hibiclens is used.  Rinse your body thoroughly.  Pat yourself dry with a clean, soft towel.  Do not use lotion, cream, or powder.  Wear clean clothes.    STEP #2: THE MORNING OF YOUR SURGERY     Repeat Step #1.    * Not to be used by people allergic to Chlorhexidine.

## 2025-06-18 ENCOUNTER — HOSPITAL ENCOUNTER (OUTPATIENT)
Facility: HOSPITAL | Age: 55
Discharge: HOME | End: 2025-06-18
Attending: UROLOGY | Admitting: UROLOGY
Payer: COMMERCIAL

## 2025-06-18 ENCOUNTER — ANESTHESIA (OUTPATIENT)
Dept: SURGERY | Facility: HOSPITAL | Age: 55
End: 2025-06-18
Payer: COMMERCIAL

## 2025-06-18 VITALS
WEIGHT: 225 LBS | SYSTOLIC BLOOD PRESSURE: 127 MMHG | HEIGHT: 66 IN | TEMPERATURE: 98 F | DIASTOLIC BLOOD PRESSURE: 72 MMHG | BODY MASS INDEX: 36.16 KG/M2 | HEART RATE: 86 BPM | OXYGEN SATURATION: 95 % | RESPIRATION RATE: 18 BRPM

## 2025-06-18 DIAGNOSIS — N43.3 HYDROCELE IN ADULT: ICD-10-CM

## 2025-06-18 DIAGNOSIS — N43.3 BILATERAL HYDROCELE: ICD-10-CM

## 2025-06-18 DIAGNOSIS — Z01.818 PRE-OP TESTING: Primary | ICD-10-CM

## 2025-06-18 DIAGNOSIS — I10 ESSENTIAL HYPERTENSION: ICD-10-CM

## 2025-06-18 PROCEDURE — 37000009 HC ANESTHESIA EA ADD 15 MINS: Performed by: UROLOGY

## 2025-06-18 PROCEDURE — 63600175 PHARM REV CODE 636 W HCPCS

## 2025-06-18 PROCEDURE — 25000003 PHARM REV CODE 250

## 2025-06-18 PROCEDURE — 63600175 PHARM REV CODE 636 W HCPCS: Mod: TB | Performed by: UROLOGY

## 2025-06-18 PROCEDURE — 88302 TISSUE EXAM BY PATHOLOGIST: CPT | Mod: 26,,, | Performed by: PATHOLOGY

## 2025-06-18 PROCEDURE — 71000015 HC POSTOP RECOV 1ST HR: Performed by: UROLOGY

## 2025-06-18 PROCEDURE — 55040 REMOVAL OF HYDROCELE: CPT | Mod: LT,,, | Performed by: UROLOGY

## 2025-06-18 PROCEDURE — 71000016 HC POSTOP RECOV ADDL HR: Performed by: UROLOGY

## 2025-06-18 PROCEDURE — 37000008 HC ANESTHESIA 1ST 15 MINUTES: Performed by: UROLOGY

## 2025-06-18 PROCEDURE — C1729 CATH, DRAINAGE: HCPCS | Performed by: UROLOGY

## 2025-06-18 PROCEDURE — 71000033 HC RECOVERY, INTIAL HOUR: Performed by: UROLOGY

## 2025-06-18 PROCEDURE — 36000707: Performed by: UROLOGY

## 2025-06-18 PROCEDURE — 36000706: Performed by: UROLOGY

## 2025-06-18 PROCEDURE — 88302 TISSUE EXAM BY PATHOLOGIST: CPT | Mod: TC | Performed by: UROLOGY

## 2025-06-18 PROCEDURE — 25000003 PHARM REV CODE 250: Performed by: UROLOGY

## 2025-06-18 RX ORDER — ACETAMINOPHEN 500 MG
1000 TABLET ORAL
Status: COMPLETED | OUTPATIENT
Start: 2025-06-18 | End: 2025-06-18

## 2025-06-18 RX ORDER — LIDOCAINE HYDROCHLORIDE 10 MG/ML
1 INJECTION, SOLUTION EPIDURAL; INFILTRATION; INTRACAUDAL; PERINEURAL ONCE
Status: DISCONTINUED | OUTPATIENT
Start: 2025-06-18 | End: 2025-06-19 | Stop reason: HOSPADM

## 2025-06-18 RX ORDER — PROPOFOL 10 MG/ML
VIAL (ML) INTRAVENOUS
Status: DISCONTINUED | OUTPATIENT
Start: 2025-06-18 | End: 2025-06-18

## 2025-06-18 RX ORDER — MIDAZOLAM HYDROCHLORIDE 5 MG/ML
INJECTION INTRAMUSCULAR; INTRAVENOUS
Status: DISCONTINUED | OUTPATIENT
Start: 2025-06-18 | End: 2025-06-18

## 2025-06-18 RX ORDER — SODIUM CHLORIDE 0.9 % (FLUSH) 0.9 %
10 SYRINGE (ML) INJECTION
Status: DISCONTINUED | OUTPATIENT
Start: 2025-06-18 | End: 2025-06-19 | Stop reason: HOSPADM

## 2025-06-18 RX ORDER — CEFAZOLIN SODIUM 1 G/3ML
INJECTION, POWDER, FOR SOLUTION INTRAMUSCULAR; INTRAVENOUS
Status: DISCONTINUED | OUTPATIENT
Start: 2025-06-18 | End: 2025-06-18

## 2025-06-18 RX ORDER — GLUCAGON 1 MG
1 KIT INJECTION
Status: DISCONTINUED | OUTPATIENT
Start: 2025-06-18 | End: 2025-06-19 | Stop reason: HOSPADM

## 2025-06-18 RX ORDER — KETAMINE HCL IN 0.9 % NACL 50 MG/5 ML
SYRINGE (ML) INTRAVENOUS
Status: DISCONTINUED | OUTPATIENT
Start: 2025-06-18 | End: 2025-06-18

## 2025-06-18 RX ORDER — FENTANYL CITRATE 50 UG/ML
INJECTION, SOLUTION INTRAMUSCULAR; INTRAVENOUS
Status: DISCONTINUED | OUTPATIENT
Start: 2025-06-18 | End: 2025-06-18

## 2025-06-18 RX ORDER — ACETAMINOPHEN 500 MG
1000 TABLET ORAL
Status: DISCONTINUED | OUTPATIENT
Start: 2025-06-18 | End: 2025-06-18 | Stop reason: HOSPADM

## 2025-06-18 RX ORDER — BUPIVACAINE 13.3 MG/ML
INJECTION, SUSPENSION, LIPOSOMAL INFILTRATION
Status: DISCONTINUED | OUTPATIENT
Start: 2025-06-18 | End: 2025-06-18 | Stop reason: HOSPADM

## 2025-06-18 RX ORDER — LIDOCAINE HYDROCHLORIDE 10 MG/ML
INJECTION, SOLUTION INTRAVENOUS
Status: DISCONTINUED | OUTPATIENT
Start: 2025-06-18 | End: 2025-06-18

## 2025-06-18 RX ORDER — ONDANSETRON HYDROCHLORIDE 2 MG/ML
INJECTION, SOLUTION INTRAVENOUS
Status: DISCONTINUED | OUTPATIENT
Start: 2025-06-18 | End: 2025-06-18

## 2025-06-18 RX ORDER — PROCHLORPERAZINE EDISYLATE 5 MG/ML
5 INJECTION INTRAMUSCULAR; INTRAVENOUS EVERY 30 MIN PRN
Status: DISCONTINUED | OUTPATIENT
Start: 2025-06-18 | End: 2025-06-19 | Stop reason: HOSPADM

## 2025-06-18 RX ORDER — DEXAMETHASONE SODIUM PHOSPHATE 4 MG/ML
INJECTION, SOLUTION INTRA-ARTICULAR; INTRALESIONAL; INTRAMUSCULAR; INTRAVENOUS; SOFT TISSUE
Status: DISCONTINUED | OUTPATIENT
Start: 2025-06-18 | End: 2025-06-18

## 2025-06-18 RX ORDER — HYDROMORPHONE HYDROCHLORIDE 2 MG/ML
0.2 INJECTION, SOLUTION INTRAMUSCULAR; INTRAVENOUS; SUBCUTANEOUS EVERY 5 MIN PRN
Status: DISCONTINUED | OUTPATIENT
Start: 2025-06-18 | End: 2025-06-19 | Stop reason: HOSPADM

## 2025-06-18 RX ORDER — OXYCODONE AND ACETAMINOPHEN 5; 325 MG/1; MG/1
1 TABLET ORAL EVERY 4 HOURS PRN
Qty: 12 TABLET | Refills: 0 | Status: SHIPPED | OUTPATIENT
Start: 2025-06-18

## 2025-06-18 RX ORDER — CEFAZOLIN 2 G/1
2 INJECTION, POWDER, FOR SOLUTION INTRAMUSCULAR; INTRAVENOUS
Status: DISCONTINUED | OUTPATIENT
Start: 2025-06-18 | End: 2025-06-19 | Stop reason: HOSPADM

## 2025-06-18 RX ORDER — MIDAZOLAM HYDROCHLORIDE 1 MG/ML
INJECTION INTRAMUSCULAR; INTRAVENOUS
Status: DISCONTINUED | OUTPATIENT
Start: 2025-06-18 | End: 2025-06-18

## 2025-06-18 RX ADMIN — ONDANSETRON 4 MG: 2 INJECTION, SOLUTION INTRAMUSCULAR; INTRAVENOUS at 10:06

## 2025-06-18 RX ADMIN — DEXAMETHASONE SODIUM PHOSPHATE 4 MG: 4 INJECTION, SOLUTION INTRA-ARTICULAR; INTRALESIONAL; INTRAMUSCULAR; INTRAVENOUS; SOFT TISSUE at 10:06

## 2025-06-18 RX ADMIN — PROPOFOL 40 MG: 10 INJECTION, EMULSION INTRAVENOUS at 10:06

## 2025-06-18 RX ADMIN — PROPOFOL 180 MG: 10 INJECTION, EMULSION INTRAVENOUS at 10:06

## 2025-06-18 RX ADMIN — PROPOFOL 125 MCG/KG/MIN: 10 INJECTION, EMULSION INTRAVENOUS at 10:06

## 2025-06-18 RX ADMIN — Medication 20 MG: at 10:06

## 2025-06-18 RX ADMIN — MIDAZOLAM HYDROCHLORIDE 2 MG: 1 INJECTION, SOLUTION INTRAMUSCULAR; INTRAVENOUS at 10:06

## 2025-06-18 RX ADMIN — ACETAMINOPHEN 1000 MG: 500 TABLET ORAL at 08:06

## 2025-06-18 RX ADMIN — LIDOCAINE HYDROCHLORIDE 100 MG: 10 INJECTION, SOLUTION INTRAVENOUS at 10:06

## 2025-06-18 RX ADMIN — CEFAZOLIN 2 G: 330 INJECTION, POWDER, FOR SOLUTION INTRAMUSCULAR; INTRAVENOUS at 10:06

## 2025-06-18 RX ADMIN — Medication 10 MG: at 10:06

## 2025-06-18 RX ADMIN — FENTANYL CITRATE 100 MCG: 50 INJECTION INTRAMUSCULAR; INTRAVENOUS at 10:06

## 2025-06-18 NOTE — PLAN OF CARE
1215  PACU d/c criteria met. VSS, AAO x 4. IV infusing, site/drsg CDI. Gauze drsg to scrotum CDI. Pt. To room 226 via stretcher with RN. Bedside hand-off to Susan HART RN-OPS

## 2025-06-18 NOTE — ANESTHESIA PROCEDURE NOTES
Intubation    Date/Time: 6/18/2025 10:24 AM    Performed by: Magan Vann MD  Authorized by: Magan aVnn MD    Intubation:     Induction:  Intravenous    Intubated:  Postinduction    Mask Ventilation:  Moderately difficult with oral airway    Attempts:  1    Attempted By:  Resident anesthesiologist    Method of Intubation:  Other (see comments) (LMA)    Difficult Airway Encountered?: No      Complications:  None    Airway Device:  Supraglottic airway/LMA    Airway Device Size:  4.0    Style/Cuff Inflation:  Cuffed (inflated to minimal occlusive pressure)    Placement Verified By:  Capnometry    Complicating Factors:  None    Findings Post-Intubation:  BS equal bilateral and atraumatic/condition of teeth unchanged

## 2025-06-18 NOTE — OP NOTE
[facility]  Operative Note    Date: 06/18/2025    Pre-Op Diagnosis: Left hydrocele    Post-Op Diagnosis: same    Procedure(s) Performed:   1.  Left hydrocelectomy    Specimen(s):  Left hydrocele sac    Staff Surgeon: Macario Bucio MD    Assistant Surgeon: Mario Stack MD    Anesthesia: General mask inhalational anesthesia    Indications: Gomez Díaz is a 55 y.o. male with a left hydrocele.  He desires surgical correction.      Findings: 250 mL of fluid was drained    Estimated Blood Loss: min    Drains: none    Procedure in detail:  After risks benefits and possible complications of hydrocelectomy were explained, the patient elected to undergo the procedure and consent was obtained. All questions were answered in the pre-operative area. The patient was transferred to the operative suite and placed in supine position on the operative table.  SCDs were applied and working.  Anesthesia was administered and the patient was prepped and draped in the usual sterile fashion. Time out was performed, elie-procedural antibiotics were confirmed.     The patient's hydrocele was palpated and brought to the anterior scrotal skin. A marking pen was used to altaf a 7 cm incision along the midline raphe.  A 15 blade was used to sharply incise the incision.  The underlying dartos and spermatic fascia was dissected with electrocautery until the hydrocele sac could be delivered through the scrotal incision. The tunica vaginalis was bluntly dissected free with a moist ray gretchen and opened sharply with Metzenbaum scissors. Care was taken to preserve the spermatic cord and testicle. 250 mL of fluid was suctioned from the hydrocele sac.  It was then opened and excised with electrocautery leaving a 1 cm remnant circumferentially around the testis. The hydrocele sac was passed off the field for pathologic analysis.  The circumferential remnant was then oversewn with a 2-0 vicryl in a running baseball fashion keeping the  epidididymis in tact.  The cord structures were inspected and found to be in tact.     The scrotum was irrigated with NS. Hemostasis was obtained with electrocautery. The testicle was returned to its orthotopic position. The dartos fascia was closed with a 2.0 vicryl in a running fashion. The skin was re approximated with a 3-0 monocryl suture in a horizontal matress fashion. Dermabond, fluffs and scrotal support was applied    The patient tolerated the procedure well and was transferred to the PACU in stable condition    Disposition:  The patient will follow up with Dr. Bucio in 6 weeks .  He was given prescriptions for percocet.  He was instructed to avoid heavy lifting x 2 weeks, ice his scrotum x 48 hours and wear scrotal support x 2 weeks.      Mario Stack MD

## 2025-06-18 NOTE — ANESTHESIA POSTPROCEDURE EVALUATION
Anesthesia Post Evaluation    Patient: Gomez Díaz    Procedure(s) Performed: Procedure(s) (LRB):  HYDROCELECTOMY (Bilateral)    Final Anesthesia Type: general      Patient location during evaluation: PACU  Patient participation: Yes- Able to Participate  Level of consciousness: awake and alert, oriented and awake  Post-procedure vital signs: reviewed and stable  Pain management: adequate  Airway patency: patent    PONV status at discharge: No PONV  Anesthetic complications: no      Cardiovascular status: stable  Respiratory status: unassisted and room air  Hydration status: euvolemic  Follow-up not needed.              Vitals Value Taken Time   /84 06/18/25 12:55   Temp 36.7 °C (98.1 °F) 06/18/25 12:25   Pulse 86 06/18/25 12:55   Resp 18 06/18/25 12:55   SpO2 96 % 06/18/25 12:55         Event Time   Out of Recovery 12:13:00         Pain/Suzette Score: Pain Rating Prior to Med Admin: 0 (6/18/2025  8:12 AM)  Suzette Score: 10 (6/18/2025  1:16 PM)          
No

## 2025-06-18 NOTE — DISCHARGE SUMMARY
[facility]  Discharge Note  Short Stay    Admit Date: 6/18/2025    Discharge Date and Time: 06/18/2025 11:28 AM      Attending Physician: Macario Bucio MD     Discharge Provider: Macario Bucio MD    Diagnoses:  There are no hospital problems to display for this patient.      Discharged Condition: stable    Procedure(s) (LRB):  HYDROCELECTOMY (Bilateral)     Hospital Course: Patient was admitted for the above procedure and tolerated the procedure well with no complications. He was discharged home in good condition on the same day.       Final Diagnoses: Same as principal problem.    Disposition: Home or Self Care    Follow up/Patient Instructions:    Medications:  Reconciled Home Medications:   Current Discharge Medication List        START taking these medications    Details   oxyCODONE-acetaminophen (PERCOCET) 5-325 mg per tablet Take 1 tablet by mouth every 4 (four) hours as needed for Pain.  Qty: 12 tablet, Refills: 0    Associated Diagnoses: Bilateral hydrocele           CONTINUE these medications which have NOT CHANGED    Details   !! allopurinol (ZYLOPRIM) 100 MG tablet Take 100 mg by mouth once daily.      amLODIPine (NORVASC) 10 MG tablet amlodipine 10 mg tablet      indomethacin (INDOCIN) 50 MG capsule TAKE ONE CAPSULE BY MOUTH THREE TIMES DAILY AFTER A MEAL      multivitamin (MEN'S MULTI-VITAMIN) per tablet Take 1 tablet by mouth once daily.      pantoprazole (PROTONIX) 40 MG tablet pantoprazole 40 mg tablet,delayed release   TAKE 1 TABLET BY MOUTH EVERY DAY      !! allopurinoL (ZYLOPRIM) 300 MG tablet Take 300 mg by mouth.      oxyCODONE (ROXICODONE) 5 MG immediate release tablet Take 1 tablet (5 mg total) by mouth every 4 (four) hours as needed for Pain (last resort pain).  Qty: 10 tablet, Refills: 0    Associated Diagnoses: Left testicular pain       !! - Potential duplicate medications found. Please discuss with provider.        Discharge Procedure Orders   BASIC METABOLIC PANEL    Standing Status: Future Number of Occurrences: 1 Standing Exp. Date: 08/18/26     Order Specific Question Answer Comments   Send normal result to authorizing provider's In Basket if patient is active on MyChart: Yes      Diet Adult Regular     No driving until:   Order Comments: No longer taking narcotic pain medications     No dressing needed     Notify your health care provider if you experience any of the following:  temperature >100.4     Notify your health care provider if you experience any of the following:  persistent nausea and vomiting or diarrhea     Notify your health care provider if you experience any of the following:  severe uncontrolled pain     Activity as tolerated         Time spent on Discharge: 15 minutes

## 2025-06-18 NOTE — TRANSFER OF CARE
"Anesthesia Transfer of Care Note    Patient: Gomez Díaz    Procedure(s) Performed: Procedure(s) (LRB):  HYDROCELECTOMY (Bilateral)    Patient location: PACU    Anesthesia Type: general    Transport from OR: Transported from OR on 6-10 L/min O2 by face mask with adequate spontaneous ventilation    Post pain: adequate analgesia    Post assessment: no apparent anesthetic complications    Post vital signs: stable    Level of consciousness: awake and alert    Nausea/Vomiting: no nausea/vomiting    Complications: none    Transfer of care protocol was followed      Last vitals: Visit Vitals  BP (!) 138/91 (BP Location: Right arm, Patient Position: Lying)   Pulse 84   Temp 36.6 °C (97.9 °F)   Resp 18   Ht 5' 6" (1.676 m)   Wt 102.1 kg (225 lb)   SpO2 96%   BMI 36.32 kg/m²     "

## 2025-06-18 NOTE — DISCHARGE INSTRUCTIONS
Post Scrotal Surgery Instructions  No strenuous activity or exercise for 1 week  Take ibuprofen and tylenol for pain, opioid medications only as needed if uncontrolled by over the counter medications  Do not strain to have a bowel movement - take miralax as needed    You can expect:  Swelling in your scrotum, if there is significant swelling or pain this should be evaluated by an MD or ER  Swelling should resolve in the next few months    You can shower in 24 hours    You can place ice on your scrotum for 30 min to 1 hour at a time for swelling  You can also elevate your scrotum under towels to help with swelling when you are sitting    You can wear a jock strap or tight white underwear to help with swelling    Call the doctor if:  Temperature is greater than 101F  Persistent vomiting and inability to keep food down         The patient will follow up with Dr. Bucio in 6 weeks .  He was given prescriptions for percocet.  He was instructed to avoid heavy lifting x 2 weeks, ice his scrotum x 48 hours and wear scrotal support x 2 weeks.          ANESTHESIA  -For the first 24 hours after surgery:  Do not drive, use heavy equipment, make important decisions, or drink alcohol  -It is normal to feel sleepy for several hours.  Rest until you are more awake.  -Have someone stay with you, if needed.  They can watch for problems and help keep you safe.  -Some possible post anesthesia side effects include: nausea and vomiting, sore throat and hoarseness, sleepiness, and dizziness.    PAIN  -If you have pain after surgery, pain medicine will help you feel better.  Take it as directed, before pain becomes severe.  Most pain relievers taken by mouth need at least 20-30 minutes to start working.  -Do not drive or drink alcohol while taking pain medicine.  -Pain medication can upset your stomach.  Taking them with a little food may help.  -Other ways to help control pain: elevation, ice, and relaxation  -Call your surgeon if still  having unmanageable pain an hour after taking pain medicine.  -Pain medicine can cause constipation.  Taking an over-the counter stool softener while on prescription pain medicine and drinking plenty of fluids can prevent this side effect.  -Call your surgeon if you have severe side effects like: breathing problems, trouble waking up, dizziness, confusion, or severe constipation.    NAUSEA  -Some people have nausea after surgery.  This is often because of anesthesia, pain, pain medicine, or the stress of surgery.  -Do not push yourself to eat.  Start off with clear liquids and soup.  Slowly move to solid foods.  Don't eat fatty, rich, spicy foods at first.  Eat smaller amounts.  -If you develop persistent nausea and vomiting please notify your surgeon immediately.    BLEEDING  -Different types of surgery require different types of care and dressing changes.  It is important to follow all instructions and advice from your surgeon.  Change dressing as directed.  Call your surgeon for any concerns regarding postop bleeding.    SIGNS OF INFECTION  -Signs of infection include: fever, swelling, drainage, and redness  -Notify your surgeon if you have a fever of 100.4 F (38.0 C) or higher.  -Notify your surgeon if you notice redness, swelling, increased pain, pus, or a foul smell at the incision site.

## 2025-06-20 LAB
ESTROGEN SERPL-MCNC: NORMAL PG/ML
INSULIN SERPL-ACNC: NORMAL U[IU]/ML
LAB AP CLINICAL INFORMATION: NORMAL
LAB AP GROSS DESCRIPTION: NORMAL
LAB AP PERFORMING LOCATION(S): NORMAL
LAB AP REPORT FOOTNOTES: NORMAL

## 2025-07-31 ENCOUNTER — OFFICE VISIT (OUTPATIENT)
Dept: UROLOGY | Facility: CLINIC | Age: 55
End: 2025-07-31
Payer: COMMERCIAL

## 2025-07-31 VITALS
DIASTOLIC BLOOD PRESSURE: 82 MMHG | BODY MASS INDEX: 36.42 KG/M2 | WEIGHT: 226.63 LBS | HEART RATE: 82 BPM | SYSTOLIC BLOOD PRESSURE: 128 MMHG | HEIGHT: 66 IN

## 2025-07-31 DIAGNOSIS — N43.3 HYDROCELE IN ADULT: Primary | ICD-10-CM

## 2025-07-31 DIAGNOSIS — N43.3 BILATERAL HYDROCELE: ICD-10-CM

## 2025-07-31 PROCEDURE — 99024 POSTOP FOLLOW-UP VISIT: CPT | Mod: S$GLB,,, | Performed by: UROLOGY

## 2025-07-31 PROCEDURE — 1159F MED LIST DOCD IN RCRD: CPT | Mod: CPTII,S$GLB,, | Performed by: UROLOGY

## 2025-07-31 PROCEDURE — 99999 PR PBB SHADOW E&M-EST. PATIENT-LVL III: CPT | Mod: PBBFAC,,, | Performed by: UROLOGY

## 2025-07-31 PROCEDURE — 3074F SYST BP LT 130 MM HG: CPT | Mod: CPTII,S$GLB,, | Performed by: UROLOGY

## 2025-07-31 PROCEDURE — 3079F DIAST BP 80-89 MM HG: CPT | Mod: CPTII,S$GLB,, | Performed by: UROLOGY

## 2025-07-31 NOTE — PROGRESS NOTES
[facility]   Clinic Note    SUBJECTIVE:     Chief Complaint   Patient presents with    Follow-up    Post-op Evaluation       Referral from: No ref. provider found.    History of Present Illness:  Gomez Díaz is a 55 y.o. male who presents to clinic for left orchalgia.    Patient presents with complaints of left testicular pain for several months.  Describes pain as dull and achy but occasionally sharp.  The pain waxes and wanes in intensity and is intermittent.  Denies any issues with erections.  Denies any high-risk sexual activity.  Denies any dysuria or significantly bothersome urinary complaints. Takes flomax. Previously had a scrotal US which revealed bilateral spermatoceles    Patient endorses no additional complaints at this time.    4/14/2025   Had left hydrocelectomy on 3/11/25.  Here for post-op check.  Complains of worsening swelling of the left testicle.  He says the swelling in the left testicle never improved after surgery.   Denies any pain.      5/12/2025  Here for f/u. Prior US at last visit showed a complicated cyst of the left epididymis and fluid collection superior to the left testicle.   Today he c/o worsening swelling of his scrotum. Does not appear to be infectious or a hematoma. Appears to be a hydrocele on PE. Not painful but uncomfortable.     5/19/25  Here today for f/u after CT. Bilateral hydrocele, small non-obstructing stones. Still very uncomfortable.    07/31/2025  Exam much improved post op  Doing well    Past Medical History:   Diagnosis Date    Allergy     Hypertension        Past Surgical History:   Procedure Laterality Date    CARPAL TUNNEL RELEASE Left     EXCISION OF HYDROCELE Left 3/11/2025    Procedure: HYDROCELECTOMY;  Surgeon: Macario Bucio MD;  Location: [facility] OR;  Service: Urology;  Laterality: Left;    EXCISION OF HYDROCELE Bilateral 6/18/2025    Procedure: HYDROCELECTOMY;  Surgeon: Macario Bucio MD;  Location: [facility] OR;  Service: Urology;  Laterality:  "Bilateral;       No family history on file.    Social History     Tobacco Use    Smoking status: Never     Passive exposure: Never   Substance Use Topics    Alcohol use: Never    Drug use: Never       Current Outpatient Medications on File Prior to Visit   Medication Sig Dispense Refill    allopurinol (ZYLOPRIM) 100 MG tablet Take 100 mg by mouth once daily.      allopurinoL (ZYLOPRIM) 300 MG tablet Take 300 mg by mouth.      amLODIPine (NORVASC) 10 MG tablet amlodipine 10 mg tablet      indomethacin (INDOCIN) 50 MG capsule TAKE ONE CAPSULE BY MOUTH THREE TIMES DAILY AFTER A MEAL      multivitamin (MEN'S MULTI-VITAMIN) per tablet Take 1 tablet by mouth once daily.      pantoprazole (PROTONIX) 40 MG tablet pantoprazole 40 mg tablet,delayed release   TAKE 1 TABLET BY MOUTH EVERY DAY      oxyCODONE (ROXICODONE) 5 MG immediate release tablet Take 1 tablet (5 mg total) by mouth every 4 (four) hours as needed for Pain (last resort pain). (Patient not taking: Reported on 7/31/2025) 10 tablet 0    oxyCODONE-acetaminophen (PERCOCET) 5-325 mg per tablet Take 1 tablet by mouth every 4 (four) hours as needed for Pain. (Patient not taking: Reported on 7/31/2025) 12 tablet 0     No current facility-administered medications on file prior to visit.       Review of patient's allergies indicates:   Allergen Reactions    Lisinopril Swelling       Review of Systems:  A review of 10+ systems was conducted with pertinent positive and negative findings documented in HPI with all other systems reviewed and negative.    OBJECTIVE:     Estimated body mass index is 36.58 kg/m² as calculated from the following:    Height as of this encounter: 5' 6" (1.676 m).    Weight as of this encounter: 102.8 kg (226 lb 10.1 oz).    Vital Signs (Most Recent)  Vitals:    07/31/25 1330   BP: 128/82   Pulse: 82         Physical Exam:  GENERAL: patient sitting comfortably  HEENT: normocephalic  NECK: supple, no JVD  PULM: normal chest rise, no increased " WOB  HEART: non-diaphoretic  ABDO: soft, nondistended, nontender  BACK: no CVA tenderness bilaterally  SKIN: warm, dry, well perfused  EXT: no bruising or edema  NEURO: grossly normal with no focal deficits  PSYCH: appropriate mood and affect    Genitourinary Exam:  Incision well healing.  No hematoma recurrent hydrocele noted.  Much improved from prior    Lab Results   Component Value Date    BUN 16 06/04/2025    CREATININE 0.9 06/04/2025    WBC 6.34 04/17/2024    HGB 13.3 (L) 04/17/2024    HCT 38.6 (L) 04/17/2024     04/17/2024    AST 32 04/17/2024    ALT 48 (H) 04/17/2024    ALKPHOS 54 (L) 04/17/2024    ALBUMIN 4.1 04/17/2024    HGBA1C 5.6 06/10/2022        Imaging:  I have personally reviewed all relevant imaging studies.    Results for orders placed or performed during the hospital encounter of 05/15/25 (from the past 2160 hours)   CT Renal Stone Study ABD Pelvis WO    Narrative    EXAMINATION:  CT RENAL STONE STUDY ABD PELVIS WO    CLINICAL HISTORY:  Flank pain, kidney stone suspected; Unspecified abdominal pain    TECHNIQUE:  Low dose axial images, sagittal and coronal reformations were obtained from the lung bases to the pubic symphysis.  Contrast was not administered.    COMPARISON:  Chest CT 02/19/2013    FINDINGS:  The imaged portions of the lung bases and heart show no significant abnormalities.  Small Peggy fissural nodule on the right can be seen on studies dating back to 2013 likely benign.    There is diffuse low-attenuation of the liver suggesting hepatic steatosis with fatty sparing near the gallbladder fossa.  No suspicious liver lesions detected on noncontrast imaging.    Stomach, pancreas, and adrenal glands show no significant abnormalities.  Spleen is mildly enlarged.    Gallbladder shows no radiopaque stones or inflammatory changes.  No biliary ductal dilatation.    Kidneys are normal in size, contour and location.  Small right renal hypodensities difficult to fully characterize but  likely small cysts.  There are punctate renal stones best visualized on coronal images.  There are 2 small (2-3 mm) stones in the right kidney and a left midpole punctate stone measuring approximately 2 mm.  There are no ureteral stones or hydronephrosis.  Urinary bladder is partially distended and otherwise unremarkable.  There are prostate calcifications.  Prostate gland is normal in size.  There are bilateral scrotal hydroceles.    The small and large bowel show no acute inflammation or obstruction.  No free air or ascites.  Appendix is normal.    Abdominal aorta is normal in caliber with mild atherosclerosis.    No abnormal lymph node enlargement.    Osseous structures show no acute abnormalities.      Impression    Small bilateral renal stones.  No ureteral stones or hydronephrosis.    Bilateral scrotal hydroceles.    Diffuse hepatic steatosis.    Mild splenomegaly.      Electronically signed by: Xiang Barnett MD  Date:    05/16/2025  Time:    11:55     No results found for this or any previous visit (from the past 2160 hours).  CT Renal Stone Study ABD Pelvis WO  Narrative: EXAMINATION:  CT RENAL STONE STUDY ABD PELVIS WO    CLINICAL HISTORY:  Flank pain, kidney stone suspected; Unspecified abdominal pain    TECHNIQUE:  Low dose axial images, sagittal and coronal reformations were obtained from the lung bases to the pubic symphysis.  Contrast was not administered.    COMPARISON:  Chest CT 02/19/2013    FINDINGS:  The imaged portions of the lung bases and heart show no significant abnormalities.  Small Peggy fissural nodule on the right can be seen on studies dating back to 2013 likely benign.    There is diffuse low-attenuation of the liver suggesting hepatic steatosis with fatty sparing near the gallbladder fossa.  No suspicious liver lesions detected on noncontrast imaging.    Stomach, pancreas, and adrenal glands show no significant abnormalities.  Spleen is mildly enlarged.    Gallbladder shows no  "radiopaque stones or inflammatory changes.  No biliary ductal dilatation.    Kidneys are normal in size, contour and location.  Small right renal hypodensities difficult to fully characterize but likely small cysts.  There are punctate renal stones best visualized on coronal images.  There are 2 small (2-3 mm) stones in the right kidney and a left midpole punctate stone measuring approximately 2 mm.  There are no ureteral stones or hydronephrosis.  Urinary bladder is partially distended and otherwise unremarkable.  There are prostate calcifications.  Prostate gland is normal in size.  There are bilateral scrotal hydroceles.    The small and large bowel show no acute inflammation or obstruction.  No free air or ascites.  Appendix is normal.    Abdominal aorta is normal in caliber with mild atherosclerosis.    No abnormal lymph node enlargement.    Osseous structures show no acute abnormalities.  Impression: Small bilateral renal stones.  No ureteral stones or hydronephrosis.    Bilateral scrotal hydroceles.    Diffuse hepatic steatosis.    Mild splenomegaly.    Electronically signed by: Xiang Barnett MD  Date:    05/16/2025  Time:    11:55       PSA:  Lab Results   Component Value Date    PSA 0.43 04/17/2024    PSA 0.34 07/06/2023    PSA 0.48 06/10/2022    PSA 0.40 05/27/2021    PSA 0.32 03/03/2020    PSA 0.30 02/13/2019    PSA 0.39 04/25/2018    PSA 0.36 03/27/2017    PSATOTAL 0.6 02/05/2016       Testosterone:  No results found for: "TOTALTESTOST", "TESTOSTERONE"     ASSESSMENT     1. Hydrocele in adult    2. Bilateral hydrocele              PLAN:     Exam much improved.  Can follow up with me PRSHEREE Bucio MD  Urology  [facility]    Disclaimer: This note has been generated using voice-recognition software. There may be typographical errors that have been missed during proof-reading.             "

## (undated) DEVICE — PACK SURGERY START

## (undated) DEVICE — COVER TABLE HVY DTY 60X90IN

## (undated) DEVICE — NDL HYPO REG 25G X 1 1/2

## (undated) DEVICE — BLADE SURG STAINLESS STEEL #10

## (undated) DEVICE — SUT 0 18IN SILK BLK BRAIDE

## (undated) DEVICE — STAPLER SKIN COUNT 35 W STPL

## (undated) DEVICE — TOWEL OR XRAY BLUE 17X26IN

## (undated) DEVICE — GAUZE SPONGE PEANUT STRL

## (undated) DEVICE — SUT 2-0 12-18IN SILK

## (undated) DEVICE — CONTAINER SPECIMEN 4OZ

## (undated) DEVICE — PACK ECLIPSE BASIC III SURG

## (undated) DEVICE — SUT MONOCRYL 3-0 PS-2 UND

## (undated) DEVICE — MANIFOLD 4 PORT

## (undated) DEVICE — SUT 4-0 MONO PLUS RB-1

## (undated) DEVICE — DRAIN PENRS SIL STRL .25X18IN

## (undated) DEVICE — SYR 10CC LUER LOCK

## (undated) DEVICE — GLOVE BIOGEL SKINSENSE PI 7.0

## (undated) DEVICE — CLIPPER BLADE MOD 4406 (CAREF)

## (undated) DEVICE — COVER LIGHT HANDLE 80/CA

## (undated) DEVICE — PANTIES FEMININE NAPKIN LG/XLG

## (undated) DEVICE — ELECTRODE BLADE INSULATED 1 IN

## (undated) DEVICE — GOWN POLY REINF X-LONG 2XL

## (undated) DEVICE — DRAPE ADOLESCENT LAP

## (undated) DEVICE — ELECTRODE REM PLYHSV RETURN 9

## (undated) DEVICE — SUT 2/0 30IN SILK BLK BRAI

## (undated) DEVICE — SPONGE COTTON TRAY 4X4IN

## (undated) DEVICE — LOOP VES MAXI BLU 2.5X1MM 18IN

## (undated) DEVICE — SOL IRR 0.9% NACL 500ML PB

## (undated) DEVICE — SCRUB DYNA-HEX LIQ 4% CHG 4OZ

## (undated) DEVICE — COVER OVERHEAD SURG LT BLUE

## (undated) DEVICE — SYR ONLY 60CC TOOMEY

## (undated) DEVICE — PENCIL SMK EVAC CONNECTOR 10FT

## (undated) DEVICE — SUT VICRYL CTD 2-0 GI 27 SH

## (undated) DEVICE — DRAPE LAP T SHT W/ INSTR PAD

## (undated) DEVICE — SUT 2-0 VICRYL / SH (J417)

## (undated) DEVICE — ADHESIVE DERMABOND ADVANCED